# Patient Record
Sex: FEMALE | Race: BLACK OR AFRICAN AMERICAN | Employment: FULL TIME | URBAN - METROPOLITAN AREA
[De-identification: names, ages, dates, MRNs, and addresses within clinical notes are randomized per-mention and may not be internally consistent; named-entity substitution may affect disease eponyms.]

---

## 2017-07-11 ENCOUNTER — HOSPITAL ENCOUNTER (OUTPATIENT)
Dept: PHYSICAL THERAPY | Age: 47
Discharge: HOME | End: 2017-07-11
Payer: COMMERCIAL

## 2017-07-11 PROCEDURE — 97110 THERAPEUTIC EXERCISES: CPT

## 2017-07-11 PROCEDURE — 97530 THERAPEUTIC ACTIVITIES: CPT

## 2017-07-11 PROCEDURE — 97162 PT EVAL MOD COMPLEX 30 MIN: CPT

## 2017-07-11 NOTE — PROGRESS NOTES
PT DAILY TREATMENT NOTE     Patient Name: Nila Li  Date:2017  : 1970  [x]  Patient  Verified  Payor: BLUE [employer] / Plan: Tayo Meek [id number] PPO / Product Type: PPO /    In time:1:50 pm   Out time:2:45 pm   Total Treatment Time (min): 55  Visit #: 1 of 6    Treatment Area: Contusion of left hip [S70.02XA]    SUBJECTIVE  Pain Level (0-10 scale): 1-2  Any medication changes, allergies to medications, adverse drug reactions, diagnosis change, or new procedure performed?: [x] No    [] Yes (see summary sheet for update)  Subjective functional status/changes:   [] No changes reported    Hx Present Illness: Pt reported that while on vacation in [place] slipped on stairs (on boat) and hip right hip   DOI 17   X-rays immediately following - per pt no fracture  No outward bruising but increased pain   Increase in pain with sitting, lying on the right side, walking, driving  Pain immediately with sitting   Reports that feels like something is weighting on it  Tender to touch  PLOF - pain free and unrestricted with daily, recreational and work related activities      Pain:  _5__/10 max       __0-1_/10 min     _1-2___/10 now    Location: indicates right iliac crest to ischial tuberosity     [] Sharp    [] Dull      [x] Burning     []  Aching     [] Throbbing      [] Tingling     [] Other:       [x]  Constant                   [] Intermittent        Previous treatment:   Ice, lidocaine patch - reports some relief, Tylenol extra strength - per pt 2 x daily   Flexeril - currently not taking  Prednisone - 4 days - unsure if Flexeril or prednisone making difference in managing sx      PMHX: Significant for: unremarkable as per pt     Social/Recreation/Work: Administrative - sitting, desk work,   Per pt interfering with sitting and walking, reports increase in pain with reach  Work-out/exercise at [place], bicycle, weights - currently not exercising  Walking for exercise- 2 days per week     Patient Goal(s): \"To be out of pain. \"      OBJECTIVE    Modality rationale:    Min Type Additional Details    [] Estim:  []Unatt       []IFC  []Premod                        []Other:  []w/ice   []w/heat  Position:  Location:    [] Estim: []Att    []TENS instruct  []NMES                    []Other:  []w/US   []w/ice   []w/heat  Position:  Location:    []  Traction: [] Cervical       []Lumbar                       [] Prone          []Supine                       []Intermittent   []Continuous Lbs:  [] before manual  [] after manual    []  Ultrasound: []Continuous   [] Pulsed                           []1MHz   []3MHz W/cm2:  Location:    []  Iontophoresis with dexamethasone         Location: [] Take home patch   [] In clinic    []  Ice     []  heat  []  Ice massage  []  Laser   []  Anodyne Position:  Location:    []  Laser with stim  []  Other:  Position:  Location:    []  Vasopneumatic Device Pressure:       [] lo [] med [] hi   Temperature: [] lo [] med [] hi   [] Skin assessment post-treatment:  []intact []redness- no adverse reaction    []redness  adverse reaction:     35 min [x]Eval                  []Re-Eval       10 min Therapeutic Exercise:  [] See flow sheet : reviewed HEP   Rationale: increase ROM and increase strength to improve the patients ability to perform daily activities with decreased pain and symptom levels            With   [] TE   [x] TA - 10 min   [] neuro   [] other: Patient Education: [x] Review HEP    [] Progressed/Changed HEP based on:   [x] positioning - sleeping   [] body mechanics   [] transfers   [x] heat/ice application     [x] other: reviewed exam findings,anatomy involved, POC     Other Objective/Functional Measures:    Movement/gait:  Right shoulder lower, right lateralization, decreased right arm swing     Visual Inspection: Thoracic: WFL  Lumbar: increased  Shoulder/Scapula: right shoulder and scap lower, inferior angle tipped bilaterally   No visible swelling or brising    Palpation: TTP along right iliac crest, greater trochanter, glute med and min                            AROM/PROM Right Left   Hip Flex     Hip IR 25 28   Hip ER 20 24                    Strength Right Left   Hip Flex 4+ 4+   Knee Ext 5 5   Hamstrings 4+ 4+   Hip Abd 4- 4-   Hip Ext 4 4   Hip IR     Hip ER          Glut inhibition bilaterally with MMT of Hip Ext    Special Tests Right Left   Trunk Rot (hooklying) 18 in 17 in    SUYAPA - -   Hip Scour - -   Gaenslen's  + +, right side p! Passive SLR 90 80          Other Right Left                                       Other Comments: Standing Forward Flexion finger tips to floor, decreased reversal of lordosis and decreased use of gluts to stand  Gillet: hypomobility bilaterally Right >left, increased lateralization difficulty with stance on Right LE         Pain Level (0-10 scale) post treatment: 1-2    ASSESSMENT/Changes in Function:   Pt is a 51 y. o.female with C/C of right lateral hip pain. Onset occurred after pt slipped and fell, hitting right hip on steps on 6/26/17 while pt was on vacation. Signs/Symptoms consistent with right hip contusion and SIJ dysfunction. Objective findings include positive SIJ provocation tests, decreased hip IR and ER ROM, decreased trunk rotation, decreased glut strength and TTP along right iliac crest, greater trochanter, glute med and min. Functional limitations include pain with sitting, driving, right sidelying and walking. Patient will continue to benefit from skilled PT services to modify and progress therapeutic interventions, address functional mobility deficits, address ROM deficits, address strength deficits, analyze and address soft tissue restrictions, analyze and cue movement patterns, analyze and modify body mechanics/ergonomics, assess and modify postural abnormalities and instruct in home and community integration to attain remaining goals.      [x]  See Plan of Care  []  See progress note/recertification  []  See Discharge Summary         Progress towards goals / Updated goals:  Short Term Goals: STG- To be accomplished in 2 visit(s):  1. Pt will be independent with HEP to encourage prophylaxis. Eval:dispensed  Current: NA    Long Term Goals: LTG- To be accomplished in 4 week(s):  1. Pt will demonstrate ability to sit >45 min without pain to allow pt to perform work related activities. Eval:pain immediately with sitting. Current: NA    2. Pt will increase hip IR and ER AROM to [facility] to increase tolerance to ADLs. Eval:  AROM/PROM Right Left   Hip IR 25 28   Hip ER 20 24     Current: NA    3. Pt will increase trunk rotation to 16 in or less bilaterally to indicate mobility needed for daily activities. Eval:Right 18 in Left 17 in   Current: NA    4. Pt FOTO score will increase by >15 points to show improvement in function. Eval:66  Current: will address at visit 5      PLAN  [x]  Upgrade activities as tolerated     []  Continue plan of care  []  Update interventions per flow sheet       []  Discharge due to:_  []  Other:_      Maryanne Farris, PT, DPT 7/11/2017  1:54 PM    No future appointments.

## 2017-07-13 NOTE — PROGRESS NOTES
[facility] at the [address], [place], [address] [facility]  Phone: [phone]      Fax:  [phone]       Plan of Care/ Statement of Necessity for Physical Therapy Services      Patient name: Enrique Winslow Start of Care: 2017   Referral source: John Cherry MD : 1970    Medical Diagnosis: Contusion of left hip [S70.02XA]   Onset Date:17    Treatment Diagnosis: Left hip pain and Lumbopelvic Dysfunction    Prior Hospitalization: see medical history Provider#: [id number]   Medications: Verified on Patient summary List    Comorbidities: BMI >30, Hx of back pain, HTN    Prior Level of Function: pain free and unrestricted with daily, recreational and work related activities      The Plan of Care and following information is based on the information from the initial evaluation. Assessment/ key information:   Pt is a 51 y. o.female with C/C of right lateral hip pain. Onset occurred after pt slipped and fell, hitting right hip on steps on 17 while pt was on vacation. Signs/Symptoms consistent with right hip contusion and SIJ dysfunction. Objective findings include positive SIJ provocation tests, decreased hip IR and ER ROM, decreased trunk rotation, decreased glut strength and TTP along right iliac crest, greater trochanter, glute med and min. Functional limitations include pain with sitting, driving, right sidelying and walking. Evaluation Complexity History MEDIUM  Complexity : 1-2 comorbidities / personal factors will impact the outcome/ POC ; Examination HIGH Complexity : 4+ Standardized tests and measures addressing body structure, function, activity limitation and / or participation in recreation  ;Presentation MEDIUM Complexity : Evolving with changing characteristics  ; Clinical Decision Making MEDIUM Complexity : FOTO score of 26-74  Overall Complexity Rating: MEDIUM  Problem List: pain affecting function, decrease ROM, decrease strength, impaired gait/ balance, decrease ADL/ functional abilitiies, decrease activity tolerance and decrease flexibility/ joint mobility   Treatment Plan may include any combination of the following: Therapeutic exercise, Therapeutic activities, Neuromuscular re-education, Physical agent/modality, Gait/balance training, Manual therapy and Patient education  Patient / Family readiness to learn indicated by: asking questions, trying to perform skills and interest  Persons(s) to be included in education: patient (P)  Barriers to Learning/Limitations: None  Patient Goal (s): To be out of pain  Patient Self Reported Health Status: good  Rehabilitation Potential: good    Short Term Goals: STG- To be accomplished in 2 visit(s):  1. Pt will be independent with HEP to encourage prophylaxis. Eval:dispensed  Current: NA     Long Term Goals: LTG- To be accomplished in 4 week(s):  1. Pt will demonstrate ability to sit >45 min without pain to allow pt to perform work related activities. Eval:pain immediately with sitting. Current: NA     2.  Pt will increase hip IR and ER AROM to [facility] to increase tolerance to ADLs. Eval:  AROM/PROM Right Left   Hip IR 25 28   Hip ER 20 24      Current: NA     3.  Pt will increase trunk rotation to 16 in or less bilaterally to indicate mobility needed for daily activities. Eval:Right 18 in Left 17 in   Current: NA     4. Pt FOTO score will increase by >15 points to show improvement in function. Eval:66  Current: will address at visit 5    Frequency / Duration: Patient to be seen 1-2 times per week for 2-4 weeks. Pt may only be able to come 1x per week due to co-pay.     Patient/ Caregiver education and instruction: Diagnosis, prognosis, self care, activity modification and exercises   [x]  Plan of care has been reviewed with PTA Saintclair Butte, PT, DPT 7/13/2017 9:55 PM  _____________________________________________________________________  I certify that the above Therapy Services are being furnished while the patient is under my care. I agree with the treatment plan and certify that this therapy is necessary.     Physician's Signature:____________________  Date:__________Time:______    Please sign and return to [facility] at the [address], [address] [facility]       Phone: [phone]      Fax:  [phone]

## 2017-07-18 ENCOUNTER — HOSPITAL ENCOUNTER (OUTPATIENT)
Dept: PHYSICAL THERAPY | Age: 47
Discharge: HOME | End: 2017-07-18
Payer: COMMERCIAL

## 2017-07-18 PROCEDURE — 97110 THERAPEUTIC EXERCISES: CPT

## 2017-07-18 PROCEDURE — 97530 THERAPEUTIC ACTIVITIES: CPT

## 2017-07-18 NOTE — PROGRESS NOTES
PT DAILY TREATMENT NOTE     Patient Name: Margi Postal  Date:2017  : 1970  [x]  Patient  Verified  Payor: BLUE [employer]OSS / Plan: [facility] PPO / Product Type: PPO /    In time:2:06  Out time:2:58  Total Treatment Time (min): 52  Visit #: 2 of 6    Treatment Area: Contusion of left hip [S70.02XA]    SUBJECTIVE  Pain Level (0-10 scale): 0/10  Any medication changes, allergies to medications, adverse drug reactions, diagnosis change, or new procedure performed?: [x] No    [] Yes (see summary sheet for update)  Subjective functional status/changes:   [] No changes reported  \"I don't have any pain. I only have pain after I do my exercises. \"    OBJECTIVE    30 min Therapeutic Exercise:  [x] See flow sheet :   Rationale: increase ROM, increase strength and improve coordination to improve the patients ability to perform ADLs with less pain. 22 min Therapeutic Activity:  [x]  See flow sheet :   Rationale: increase ROM, increase strength and improve coordination  to improve the patients ability to perform ADLs with less pain. With   [] TE   [] TA   [] neuro   [] other: Patient Education: [x] Review HEP    [] Progressed/Changed HEP based on:   [] positioning   [] body mechanics   [] transfers   [] heat/ice application    [] other:      Other Objective/Functional Measures:      Pain Level (0-10 scale) post treatment: 0/10    ASSESSMENT/Changes in Function: Pt able to demonstrate HEP and show understanding to ensure maximum benefit. Pt had no increased pain with ex. Pt denied modalities post tx. Patient will continue to benefit from skilled PT services to modify and progress therapeutic interventions, address functional mobility deficits, address ROM deficits, analyze and address soft tissue restrictions, analyze and modify body mechanics/ergonomics and assess and modify postural abnormalities to attain remaining goals.      []  See Plan of Care  []  See progress note/recertification  []  See Discharge Summary         Progress towards goals / Updated goals:  Short Term Goals: STG- To be accomplished in 2 visit(s):  1.  Pt will be independent with HEP to encourage prophylaxis. Eval:dispensed  Current: pt reports compliance      Long Term Goals: LTG- To be accomplished in 4 week(s):  1.  Pt will demonstrate ability to sit >45 min without pain to allow pt to perform work related activities. Eval:pain immediately with sitting. Current: NA      2.  Pt will increase hip IR and ER AROM to [facility] to increase tolerance to ADLs. Eval:  AROM/PROM Right Left   Hip IR 25 28   Hip ER 20 24       Current: NA      3.  Pt will increase trunk rotation to 16 in or less bilaterally to indicate mobility needed for daily activities. Eval:Right 18 in Left 17 in   Current: NA      4.  Pt FOTO score will increase by >15 points to show improvement in function.   Eval:66  Current: will address at visit 5      PLAN  []  Upgrade activities as tolerated     [x]  Continue plan of care  []  Update interventions per flow sheet       []  Discharge due to:_  []  Other:_      Tony Echevarria 7/18/2017  3:50 PM    Future Appointments  Date Time Provider [place]   7/25/2017 1:30 PM Tony Echevarria [facility]

## 2017-07-25 ENCOUNTER — HOSPITAL ENCOUNTER (OUTPATIENT)
Dept: PHYSICAL THERAPY | Age: 47
Discharge: HOME | End: 2017-07-25
Payer: COMMERCIAL

## 2017-07-25 PROCEDURE — 97530 THERAPEUTIC ACTIVITIES: CPT

## 2017-07-25 PROCEDURE — 97110 THERAPEUTIC EXERCISES: CPT

## 2017-07-25 NOTE — PROGRESS NOTES
PT DAILY TREATMENT NOTE     Patient Name: Farrah York  Date:2017  : 1970  [x]  Patient  Verified  Payor: BLUE [employer]OSS / Plan: [facility] PPO / Product Type: PPO /    In time:1:30  Out time:2:26  Total Treatment Time (min): 56  Visit #: 3 of 6    Treatment Area: Contusion of left hip [S70.02XA]    SUBJECTIVE  Pain Level (0-10 scale): 0/10  Any medication changes, allergies to medications, adverse drug reactions, diagnosis change, or new procedure performed?: [x] No    [] Yes (see summary sheet for update)  Subjective functional status/changes:   [] No changes reported  \"I don't have any pain right now. I only get pain when I do exercises. \"    OBJECTIVE    Modality rationale: decrease edema, decrease inflammation and decrease pain to improve the patients ability to perform ADLs with less pain.     Min Type Additional Details    [] Estim:  []Unatt       []IFC  []Premod                        []Other:  []w/ice   []w/heat  Position:  Location:    [] Estim: []Att    []TENS instruct  []NMES                    []Other:  []w/US   []w/ice   []w/heat  Position:  Location:    []  Traction: [] Cervical       []Lumbar                       [] Prone          []Supine                       []Intermittent   []Continuous Lbs:  [] before manual  [] after manual    []  Ultrasound: []Continuous   [] Pulsed                           []1MHz   []3MHz W/cm2:  Location:    []  Iontophoresis with dexamethasone         Location: [] Take home patch   [] In clinic   10 [x]  Ice     []  heat  []  Ice massage  []  Laser   []  Anodyne Position: supine  Location: low back    []  Laser with stim  []  Other:  Position:  Location:    []  Vasopneumatic Device Pressure:       [] lo [] med [] hi   Temperature: [] lo [] med [] hi   [x] Skin assessment post-treatment:  [x]intact []redness- no adverse reaction    []redness  adverse reaction:     30 min Therapeutic Exercise:  [x] See flow sheet :   Rationale: increase ROM, increase strength and improve coordination to improve the patients ability to perform ADLs with less pain. 16 min Therapeutic Activity:  [x]  See flow sheet :   Rationale: increase ROM, increase strength and improve coordination  to improve the patients ability to perform ADLs with less pain. With   [] TE   [] TA   [] neuro   [] other: Patient Education: [x] Review HEP    [] Progressed/Changed HEP based on:   [] positioning   [] body mechanics   [] transfers   [] heat/ice application    [] other:      Other Objective/Functional Measures: updated measures     Pain Level (0-10 scale) post treatment: 0/10    ASSESSMENT/Changes in Function: Pt reports increased pain after ex at home but no pain post tx. Pt received CP post tx. Pt shows progression in objective measurements. Patient will continue to benefit from skilled PT services to modify and progress therapeutic interventions, address functional mobility deficits, address ROM deficits, address strength deficits, analyze and address soft tissue restrictions and analyze and modify body mechanics/ergonomics to attain remaining goals. []  See Plan of Care  []  See progress note/recertification  []  See Discharge Summary         Progress towards goals / Updated goals:  Short Term Goals: STG- To be accomplished in 2 visit(s):  1.  Pt will be independent with HEP to encourage prophylaxis. Eval:dispensed  Current: pt reports compliance      Long Term Goals: LTG- To be accomplished in 4 week(s):  1.  Pt will demonstrate ability to sit >45 min without pain to allow pt to perform work related activities. Eval:pain immediately with sitting. Current: 20 minutes without pain. Pain increases after 20      2.  Pt will increase hip IR and ER AROM to [facility] to increase tolerance to ADLs.   Eval:  AROM/PROM Right Left   Hip IR 25 28   Hip ER 20 24       Current: Hip: IR: Right: 27, Left: 32 ER: Right: 22, Left: 25      3.  Pt will increase trunk rotation to 16 in or less bilaterally to indicate mobility needed for daily activities. Eval:Right 18 in Left 17 in   Current: Right: 16, Left: 18.5      4.  Pt FOTO score will increase by >15 points to show improvement in function.   Eval:66  Current: will address at visit 5         PLAN  []  Upgrade activities as tolerated     [x]  Continue plan of care  []  Update interventions per flow sheet       []  Discharge due to:_  []  Other:_      Tony Echevarria 7/25/2017  2:34 PM    Future Appointments  Date Time Provider [place]   8/1/2017 9:30 AM Nicolette Bedolla, PT, DPT [facility]

## 2017-08-01 ENCOUNTER — APPOINTMENT (OUTPATIENT)
Dept: PHYSICAL THERAPY | Age: 47
End: 2017-08-01
Payer: COMMERCIAL

## 2017-08-08 ENCOUNTER — HOSPITAL ENCOUNTER (OUTPATIENT)
Dept: PHYSICAL THERAPY | Age: 47
Discharge: HOME | End: 2017-08-08
Payer: COMMERCIAL

## 2017-08-08 PROCEDURE — 97530 THERAPEUTIC ACTIVITIES: CPT

## 2017-08-08 PROCEDURE — 97112 NEUROMUSCULAR REEDUCATION: CPT

## 2017-08-08 PROCEDURE — 97110 THERAPEUTIC EXERCISES: CPT

## 2017-08-08 NOTE — PROGRESS NOTES
PT DAILY TREATMENT NOTE     Patient Name: Rocky Osuna  Date:2017  : 1970  [x]  Patient  Verified  Payor: BLUE CROSS / Plan: [facility] PPO / Product Type: PPO /    In time:12:00 pm  Out time:1:04 pm   Total Treatment Time (min): 64  Visit #: 4 of 6    Treatment Area: Contusion of left hip [S70.02XA]    SUBJECTIVE  Pain Level (0-10 scale): 1  Any medication changes, allergies to medications, adverse drug reactions, diagnosis change, or new procedure performed?: [x] No    [] Yes (see summary sheet for update)  Subjective functional status/changes:   [] No changes reported  \"I am coming along I guess. I always have pain, I know it is there. I know it wasn't like that before. \"    OBJECTIVE    Modality rationale: decrease pain and increase tissue extensibility to improve the patients ability to tolerate daily activities    Min Type Additional Details    [] Estim:  []Unatt       []IFC  []Premod                        []Other:  []w/ice   []w/heat  Position:  Location:    [] Estim: []Att    []TENS instruct  []NMES                    []Other:  []w/US   []w/ice   []w/heat  Position:  Location:    []  Traction: [] Cervical       []Lumbar                       [] Prone          []Supine                       []Intermittent   []Continuous Lbs:  [] before manual  [] after manual    []  Ultrasound: []Continuous   [] Pulsed                           []1MHz   []3MHz W/cm2:  Location:    []  Iontophoresis with dexamethasone         Location: [] Take home patch   [] In clinic   10 []  Ice     [x]  heat  []  Ice massage  []  Laser   []  Anodyne Position: supine with LE elevated  Location: L-spine    []  Laser with stim  []  Other:  Position:  Location:    []  Vasopneumatic Device Pressure:       [] lo [] med [] hi   Temperature: [] lo [] med [] hi   [x] Skin assessment post-treatment:  [x]intact []redness- no adverse reaction    []redness  adverse reaction:       29 min Therapeutic Exercise: [x] See flow sheet :   Rationale: increase ROM, increase strength, improve coordination and increase proprioception to improve the patients ability to perform daily activities with decreased pain and symptom levels    10 min Therapeutic Activity:  [x]  See flow sheet :   Rationale: increase ROM, increase strength, improve coordination and increase proprioception  to improve the patients ability to perform daily activities with decreased pain and symptom levels     15 min Neuromuscular Re-education:  [x]  See flow sheet : tactile cues for 90-90 and scissor slides   Rationale: increase ROM, increase strength, improve coordination and increase proprioception  to improve the patients ability to perform daily activities with decreased pain and symptom levels          With   [] TE   [x] TA   [] neuro   [] other: Patient Education: [x] Review HEP    [] Progressed/Changed HEP based on:   [] positioning   [] body mechanics   [] transfers   [] heat/ice application    [] other:      Other Objective/Functional Measures: Forward flexion: fingers to floor, decreased use of gluts to stand    Hip Add Drop (pre/post): Right: +/- Left: +/+ with 1 inch from table    Trunk Rot Right: 15.5 in Left: 16 in     Pain Level (0-10 scale) post treatment: 0    ASSESSMENT/Changes in Function:   Pt reports continued low level pain, increases with sitting. Very TTP over posterior hip musculature and right greater trochanter. Discussed continuing PT x1-2 weeks. Made recommendations for sitting modifications. Patient will continue to benefit from skilled PT services to modify and progress therapeutic interventions, address functional mobility deficits, address ROM deficits, address strength deficits, analyze and address soft tissue restrictions, analyze and cue movement patterns, analyze and modify body mechanics/ergonomics, assess and modify postural abnormalities and instruct in home and community integration to attain remaining goals. []  See Plan of Care  []  See progress note/recertification  []  See Discharge Summary         Progress towards goals / Updated goals:  Short Term Goals: STG- To be accomplished in 2 visit(s):  1.  Pt will be independent with HEP to encourage prophylaxis. Eval:dispensed  Current: pt reports compliance      Long Term Goals: LTG- To be accomplished in 4 week(s):  1.  Pt will demonstrate ability to sit >45 min without pain to allow pt to perform work related activities. Eval:pain immediately with sitting. Current: 20 minutes without pain. Pain increases after 20      2.  Pt will increase hip IR and ER AROM to [facility] to increase tolerance to ADLs. Eval:  AROM/PROM Right Left   Hip IR 25 28   Hip ER 20 24       Current: Hip: IR: Right: 27, Left: 32 ER: Right: 22, Left: 25      3.  Pt will increase trunk rotation to 16 in or less bilaterally to indicate mobility needed for daily activities. Eval:Right 18 in Left 17 in   Current: Right: 15.5 in, Left: 16in - Partially Met      4.  Pt FOTO score will increase by >15 points to show improvement in function. Eval:66  Current: will address at visit 5      PLAN  [x]  Upgrade activities as tolerated     []  Continue plan of care  []  Update interventions per flow sheet       []  Discharge due to:_  []  Other:_      Debby Carcamo, PT, DPT 8/8/2017  12:07 PM    No future appointments.

## 2017-08-29 ENCOUNTER — HOSPITAL ENCOUNTER (OUTPATIENT)
Dept: PREADMISSION TESTING | Age: 47
Discharge: HOME | End: 2017-08-29
Payer: COMMERCIAL

## 2017-08-29 VITALS — HEIGHT: 65 IN | WEIGHT: 211 LBS | BODY MASS INDEX: 35.16 KG/M2

## 2017-08-29 LAB
ANION GAP SERPL CALC-SCNC: 8 MMOL/L (ref 3–18)
BUN SERPL-MCNC: 12 MG/DL (ref 7–18)
BUN/CREAT SERPL: 15 (ref 12–20)
CALCIUM SERPL-MCNC: 9.2 MG/DL (ref 8.5–10.1)
CHLORIDE SERPL-SCNC: 103 MMOL/L (ref 100–108)
CO2 SERPL-SCNC: 26 MMOL/L (ref 21–32)
CREAT SERPL-MCNC: 0.78 MG/DL (ref 0.6–1.3)
GLUCOSE SERPL-MCNC: 132 MG/DL (ref 74–99)
HCT VFR BLD AUTO: 39.9 % (ref 35–45)
HGB BLD-MCNC: 12.6 G/DL (ref 12–16)
POTASSIUM SERPL-SCNC: 4.1 MMOL/L (ref 3.5–5.5)
SODIUM SERPL-SCNC: 137 MMOL/L (ref 136–145)

## 2017-08-29 PROCEDURE — 93005 ELECTROCARDIOGRAM TRACING: CPT

## 2017-08-29 PROCEDURE — 80048 BASIC METABOLIC PNL TOTAL CA: CPT | Performed by: ANESTHESIOLOGY

## 2017-08-29 PROCEDURE — 85018 HEMOGLOBIN: CPT | Performed by: ANESTHESIOLOGY

## 2017-08-29 RX ORDER — ATENOLOL 25 MG/1
25 TABLET ORAL DAILY
COMMUNITY

## 2017-08-29 RX ORDER — CEFAZOLIN SODIUM 2 G/50ML
2 SOLUTION INTRAVENOUS ONCE
Status: CANCELLED | OUTPATIENT
Start: 2017-08-29 | End: 2017-08-29

## 2017-08-29 RX ORDER — SODIUM CHLORIDE, SODIUM LACTATE, POTASSIUM CHLORIDE, CALCIUM CHLORIDE 600; 310; 30; 20 MG/100ML; MG/100ML; MG/100ML; MG/100ML
125 INJECTION, SOLUTION INTRAVENOUS CONTINUOUS
Status: CANCELLED | OUTPATIENT
Start: 2017-08-29

## 2017-08-29 NOTE — PERIOP NOTES
No sleep apnea , had previous negative sleep study. No history of MH. PCP is aware of surgery. Care fusion instructions reviewed and kit given. Does not meet criteria for special population at this time. Not participating in clinical trials or research study.

## 2017-08-30 LAB
ATRIAL RATE: 81 BPM
CALCULATED P AXIS, ECG09: 56 DEGREES
CALCULATED R AXIS, ECG10: 27 DEGREES
CALCULATED T AXIS, ECG11: 7 DEGREES
DIAGNOSIS, 93000: NORMAL
P-R INTERVAL, ECG05: 172 MS
Q-T INTERVAL, ECG07: 374 MS
QRS DURATION, ECG06: 100 MS
QTC CALCULATION (BEZET), ECG08: 434 MS
VENTRICULAR RATE, ECG03: 81 BPM

## 2017-09-06 NOTE — H&P
A    Urology [address]  [address] [place] [address], [address] [place] [address]  phone: [phone]  fax: [phone]          Patient: Mark Whitman  YOB: 1970  Date: 09/05/2017 6:33 AM   Visit Type: Chart Update      Assessment/Plan  # Detail Type Description    1. Assessment Frequency of micturition (R35.0). Patient Plan Pt underwent PNE for her urinary difficulties. She had very good results and wants to proceed with placement of Interstim device. All risks including pain infection bleeding reviewed. She understands and wishes to proceed         2. Assessment Urgency of urination (R39.15). This 49year old female presents for Overactive Bladder and Hematuria. History of Present Illness:  1. Overactive Bladder      Onset was gradual. It occurs daily. The problem is worse. Associated symptoms include hematuria, nocturia (4 times per night), urgency, urinary incontinence (rare stress) and urinary frequency. Pertinent negatives include pelvic pressure and pressure. Additional information: Pt with alot of frequency urgency and nocturia up to 4 times. She cuts down her evening fluids after dinner. She does not drink caffiene. Patient has tried multiple treatments with no improvement. She is here today to undergo peripheral nerve evaluation (PNE). 2.  Hematuria      The patient presents with hematuria (microscopic). The problem is unchanged. She denies pain. Pertinent history includes being at least 35years of age but not history of UTIs or prior prostate surgeries. Denies aggravating factors. Denies relieving factors. She also complains of nocturia, urinary frequency, urinary incontinence and urgency. She denies nausea, pressure and vomiting. Additional information: Patient with history of microscopic hematuria. She completed her evaluation November 2016. No significant findings were noted.                   PAST MEDICAL/SURGICAL HISTORY   (Reviewed, updated)    Disease/disorder Onset Date Management Date Comments   Foot surgery       Uterine Ablation       foot surgery 1998      tib fib frac         DIAGNOSTICS HISTORY:  Test Ordered Interpretation Result completed   CT Abdomen/Pelvis   renal tubular ectasia, fibroids 10/14/2016     Test Ordered Ordering Comments Modifier   CT Abdomen/Pelvis          PROBLEM LIST:  Problem Description Onset Date   Mixed hyperlipidemia 2014   Uterine leiomyoma 2014     Allergies  Ingredient Reaction Medication Name Comment   ERYTHROMYCIN BASE        Family History:  (Reviewed, updated)  Relationship Family Member Name  Age at Death Condition Onset Age Cause of Death   Brother    Killed 39 N   Father    Cancer, throat 37 N   Mother    Leukemia 58 N         Social History:  (Reviewed, updated)  Preferred language is [place]. MARITAL STATUS/FAMILY/SOCIAL SUPPORT  Currently . Tobacco use status: Never smoked tobacco.  Smoking status: Never smoker. No passive smoke exposure. ALCOHOL  There is a history of alcohol use. consumed moderately. LIFESTYLE  Moderate activity level. Exercises 3-4 times a week. Review of Systems  System Neg/Pos Details   Constitutional Negative Chills, fever, pain and weight loss. Eyes Negative Blurred vision and double vision. Respiratory Negative Asthma and chronic cough. Cardio Negative Chest pain, heart murmur and irregular heartbeat/palpitations. GI Negative Abdominal pain, constipation, heartburn, nausea and vomiting.  Positive Hematuria, Nocturia, Urgency, Urinary frequency, Urinary incontinence.  Negative Pelvic pressure and pressure. Neuro Negative Headache, poor or worsening memory and seizures. MS Negative Back pain, difficulty walking and joint pain. Reproductive Negative Breast lump(s), breast pain and vaginal discharge. Physical Exam  Exam Findings Details   Constitutional Normal Well developed.    Neck Exam Normal Inspection - Normal.   Respiratory Normal Inspection - Normal.   Abdomen Normal No abdominal tenderness. Genitourinary Normal No Suprapubic tenderness. No CVA tenderness. No Flank mass   Extremity Normal No Edema. Psychiatric Normal Oriented to time, place, person and situation. Appropriate mood and affect. Medications (added, continued, or stopped today)  Started Medication Directions Instruction Stopped   07/26/2017 ATENOLOL 25 MG TABLET TAKE 1 TABLET BY ORAL ROUTE EVERY DAY     06/29/2017 cyclobenzaprine 10 mg tablet take 1 tablet by oral route 3 times every day     06/01/2017 Ortho-Novum 7/7/7 (28) 0.5 mg/0.75 mg/1 mg-35 mcg tablet 1 po q day as directed     06/29/2017 prednisone 10 mg tablet Taper 40 mg to 10 mg, 2 days each with food     Completed by:        Radha Gomez  09/05/2017 6:37 AM   Document generated by:  Maya Garcaí 09/05/2017 06:37 AM      -----------------------------------------------------------------------------------------------------------

## 2017-09-07 ENCOUNTER — ANESTHESIA (OUTPATIENT)
Dept: SURGERY | Age: 47
End: 2017-09-07
Payer: COMMERCIAL

## 2017-09-07 ENCOUNTER — ANESTHESIA EVENT (OUTPATIENT)
Dept: SURGERY | Age: 47
End: 2017-09-07
Payer: COMMERCIAL

## 2017-09-07 ENCOUNTER — HOSPITAL ENCOUNTER (OUTPATIENT)
Age: 47
Setting detail: OUTPATIENT SURGERY
Discharge: HOME | End: 2017-09-07
Attending: UROLOGY | Admitting: UROLOGY
Payer: COMMERCIAL

## 2017-09-07 ENCOUNTER — APPOINTMENT (OUTPATIENT)
Dept: GENERAL RADIOLOGY | Age: 47
End: 2017-09-07
Attending: UROLOGY
Payer: COMMERCIAL

## 2017-09-07 VITALS
OXYGEN SATURATION: 100 % | DIASTOLIC BLOOD PRESSURE: 88 MMHG | SYSTOLIC BLOOD PRESSURE: 131 MMHG | WEIGHT: 208.19 LBS | TEMPERATURE: 98.9 F | RESPIRATION RATE: 15 BRPM | HEIGHT: 66 IN | BODY MASS INDEX: 33.46 KG/M2 | HEART RATE: 63 BPM

## 2017-09-07 LAB — HCG SERPL QL: NEGATIVE

## 2017-09-07 PROCEDURE — C1894 INTRO/SHEATH, NON-LASER: HCPCS | Performed by: UROLOGY

## 2017-09-07 PROCEDURE — 77030018842 HC SOL IRR SOD CL 9% BAXT -A: Performed by: UROLOGY

## 2017-09-07 PROCEDURE — 76010000153 HC OR TIME 1.5 TO 2 HR: Performed by: UROLOGY

## 2017-09-07 PROCEDURE — C1778 LEAD, NEUROSTIMULATOR: HCPCS | Performed by: UROLOGY

## 2017-09-07 PROCEDURE — 74011250636 HC RX REV CODE- 250/636: Performed by: UROLOGY

## 2017-09-07 PROCEDURE — 74011000250 HC RX REV CODE- 250: Performed by: UROLOGY

## 2017-09-07 PROCEDURE — 74011000250 HC RX REV CODE- 250

## 2017-09-07 PROCEDURE — C1767 GENERATOR, NEURO NON-RECHARG: HCPCS | Performed by: UROLOGY

## 2017-09-07 PROCEDURE — 77030010507 HC ADH SKN DERMBND J&J -B: Performed by: UROLOGY

## 2017-09-07 PROCEDURE — 77030011640 HC PAD GRND REM COVD -A: Performed by: UROLOGY

## 2017-09-07 PROCEDURE — 36415 COLL VENOUS BLD VENIPUNCTURE: CPT | Performed by: UROLOGY

## 2017-09-07 PROCEDURE — C1787 PATIENT PROGR, NEUROSTIM: HCPCS | Performed by: UROLOGY

## 2017-09-07 PROCEDURE — 76210000021 HC REC RM PH II 0.5 TO 1 HR: Performed by: UROLOGY

## 2017-09-07 PROCEDURE — 77030013079 HC BLNKT BAIR HGGR 3M -A: Performed by: ANESTHESIOLOGY

## 2017-09-07 PROCEDURE — 74011000272 HC RX REV CODE- 272: Performed by: UROLOGY

## 2017-09-07 PROCEDURE — 74011250636 HC RX REV CODE- 250/636

## 2017-09-07 PROCEDURE — 77030020782 HC GWN BAIR PAWS FLX 3M -B: Performed by: UROLOGY

## 2017-09-07 PROCEDURE — 77030012020 HC ANTENA NEURSTM MEDT -B: Performed by: UROLOGY

## 2017-09-07 PROCEDURE — 76210000006 HC OR PH I REC 0.5 TO 1 HR: Performed by: UROLOGY

## 2017-09-07 PROCEDURE — 77030031139 HC SUT VCRL2 J&J -A: Performed by: UROLOGY

## 2017-09-07 PROCEDURE — 84703 CHORIONIC GONADOTROPIN ASSAY: CPT | Performed by: UROLOGY

## 2017-09-07 PROCEDURE — 76060000034 HC ANESTHESIA 1.5 TO 2 HR: Performed by: UROLOGY

## 2017-09-07 DEVICE — GENERATOR INTERSTIM X --: Type: IMPLANTABLE DEVICE | Site: BUTTOCKS | Status: FUNCTIONAL

## 2017-09-07 DEVICE — LEAD KT STIM INTERSTIM 28CM --: Type: IMPLANTABLE DEVICE | Site: BUTTOCKS | Status: FUNCTIONAL

## 2017-09-07 RX ORDER — MAGNESIUM SULFATE 100 %
4 CRYSTALS MISCELLANEOUS AS NEEDED
Status: DISCONTINUED | OUTPATIENT
Start: 2017-09-07 | End: 2017-09-07 | Stop reason: HOSPADM

## 2017-09-07 RX ORDER — ONDANSETRON 2 MG/ML
INJECTION INTRAMUSCULAR; INTRAVENOUS AS NEEDED
Status: DISCONTINUED | OUTPATIENT
Start: 2017-09-07 | End: 2017-09-07 | Stop reason: HOSPADM

## 2017-09-07 RX ORDER — PROPOFOL 10 MG/ML
INJECTION, EMULSION INTRAVENOUS AS NEEDED
Status: DISCONTINUED | OUTPATIENT
Start: 2017-09-07 | End: 2017-09-07 | Stop reason: HOSPADM

## 2017-09-07 RX ORDER — LIDOCAINE HYDROCHLORIDE 20 MG/ML
INJECTION, SOLUTION EPIDURAL; INFILTRATION; INTRACAUDAL; PERINEURAL AS NEEDED
Status: DISCONTINUED | OUTPATIENT
Start: 2017-09-07 | End: 2017-09-07 | Stop reason: HOSPADM

## 2017-09-07 RX ORDER — CEFAZOLIN SODIUM 2 G/50ML
2 SOLUTION INTRAVENOUS ONCE
Status: COMPLETED | OUTPATIENT
Start: 2017-09-07 | End: 2017-09-07

## 2017-09-07 RX ORDER — LIDOCAINE HYDROCHLORIDE 20 MG/ML
JELLY TOPICAL AS NEEDED
Status: DISCONTINUED | OUTPATIENT
Start: 2017-09-07 | End: 2017-09-07 | Stop reason: HOSPADM

## 2017-09-07 RX ORDER — FENTANYL CITRATE 50 UG/ML
25 INJECTION, SOLUTION INTRAMUSCULAR; INTRAVENOUS
Status: ACTIVE | OUTPATIENT
Start: 2017-09-07 | End: 2017-09-07

## 2017-09-07 RX ORDER — SODIUM CHLORIDE 0.9 % (FLUSH) 0.9 %
5-10 SYRINGE (ML) INJECTION AS NEEDED
Status: CANCELLED | OUTPATIENT
Start: 2017-09-07

## 2017-09-07 RX ORDER — FENTANYL CITRATE 50 UG/ML
INJECTION, SOLUTION INTRAMUSCULAR; INTRAVENOUS AS NEEDED
Status: DISCONTINUED | OUTPATIENT
Start: 2017-09-07 | End: 2017-09-07 | Stop reason: HOSPADM

## 2017-09-07 RX ORDER — SODIUM CHLORIDE 0.9 % (FLUSH) 0.9 %
5-10 SYRINGE (ML) INJECTION AS NEEDED
Status: DISCONTINUED | OUTPATIENT
Start: 2017-09-07 | End: 2017-09-07 | Stop reason: HOSPADM

## 2017-09-07 RX ORDER — NALOXONE HYDROCHLORIDE 0.4 MG/ML
0.1 INJECTION, SOLUTION INTRAMUSCULAR; INTRAVENOUS; SUBCUTANEOUS
Status: DISCONTINUED | OUTPATIENT
Start: 2017-09-07 | End: 2017-09-07 | Stop reason: HOSPADM

## 2017-09-07 RX ORDER — INSULIN LISPRO 100 [IU]/ML
INJECTION, SOLUTION INTRAVENOUS; SUBCUTANEOUS ONCE
Status: DISCONTINUED | OUTPATIENT
Start: 2017-09-07 | End: 2017-09-07 | Stop reason: HOSPADM

## 2017-09-07 RX ORDER — SODIUM CHLORIDE, SODIUM LACTATE, POTASSIUM CHLORIDE, CALCIUM CHLORIDE 600; 310; 30; 20 MG/100ML; MG/100ML; MG/100ML; MG/100ML
50 INJECTION, SOLUTION INTRAVENOUS CONTINUOUS
Status: DISCONTINUED | OUTPATIENT
Start: 2017-09-07 | End: 2017-09-07 | Stop reason: HOSPADM

## 2017-09-07 RX ORDER — GLYCOPYRROLATE 0.2 MG/ML
INJECTION INTRAMUSCULAR; INTRAVENOUS AS NEEDED
Status: DISCONTINUED | OUTPATIENT
Start: 2017-09-07 | End: 2017-09-07 | Stop reason: HOSPADM

## 2017-09-07 RX ORDER — DEXTROSE 50 % IN WATER (D50W) INTRAVENOUS SYRINGE
25-50 AS NEEDED
Status: DISCONTINUED | OUTPATIENT
Start: 2017-09-07 | End: 2017-09-07 | Stop reason: HOSPADM

## 2017-09-07 RX ORDER — SODIUM CHLORIDE, SODIUM LACTATE, POTASSIUM CHLORIDE, CALCIUM CHLORIDE 600; 310; 30; 20 MG/100ML; MG/100ML; MG/100ML; MG/100ML
125 INJECTION, SOLUTION INTRAVENOUS CONTINUOUS
Status: DISCONTINUED | OUTPATIENT
Start: 2017-09-07 | End: 2017-09-07 | Stop reason: HOSPADM

## 2017-09-07 RX ORDER — MIDAZOLAM HYDROCHLORIDE 1 MG/ML
INJECTION, SOLUTION INTRAMUSCULAR; INTRAVENOUS AS NEEDED
Status: DISCONTINUED | OUTPATIENT
Start: 2017-09-07 | End: 2017-09-07 | Stop reason: HOSPADM

## 2017-09-07 RX ORDER — SODIUM CHLORIDE 0.9 % (FLUSH) 0.9 %
5-10 SYRINGE (ML) INJECTION EVERY 8 HOURS
Status: CANCELLED | OUTPATIENT
Start: 2017-09-07

## 2017-09-07 RX ORDER — OXYCODONE AND ACETAMINOPHEN 5; 325 MG/1; MG/1
1 TABLET ORAL AS NEEDED
Status: DISCONTINUED | OUTPATIENT
Start: 2017-09-07 | End: 2017-09-07 | Stop reason: HOSPADM

## 2017-09-07 RX ORDER — ONDANSETRON 2 MG/ML
4 INJECTION INTRAMUSCULAR; INTRAVENOUS ONCE
Status: DISCONTINUED | OUTPATIENT
Start: 2017-09-07 | End: 2017-09-07 | Stop reason: HOSPADM

## 2017-09-07 RX ORDER — HYDROMORPHONE HYDROCHLORIDE 2 MG/ML
0.5 INJECTION, SOLUTION INTRAMUSCULAR; INTRAVENOUS; SUBCUTANEOUS
Status: DISCONTINUED | OUTPATIENT
Start: 2017-09-07 | End: 2017-09-07 | Stop reason: HOSPADM

## 2017-09-07 RX ADMIN — PROPOFOL 10 MG: 10 INJECTION, EMULSION INTRAVENOUS at 09:34

## 2017-09-07 RX ADMIN — PROPOFOL 10 MG: 10 INJECTION, EMULSION INTRAVENOUS at 09:21

## 2017-09-07 RX ADMIN — PROPOFOL 10 MG: 10 INJECTION, EMULSION INTRAVENOUS at 09:19

## 2017-09-07 RX ADMIN — PROPOFOL 10 MG: 10 INJECTION, EMULSION INTRAVENOUS at 08:59

## 2017-09-07 RX ADMIN — PROPOFOL 10 MG: 10 INJECTION, EMULSION INTRAVENOUS at 08:05

## 2017-09-07 RX ADMIN — PROPOFOL 10 MG: 10 INJECTION, EMULSION INTRAVENOUS at 08:21

## 2017-09-07 RX ADMIN — PROPOFOL 20 MG: 10 INJECTION, EMULSION INTRAVENOUS at 08:38

## 2017-09-07 RX ADMIN — PROPOFOL 10 MG: 10 INJECTION, EMULSION INTRAVENOUS at 09:31

## 2017-09-07 RX ADMIN — FENTANYL CITRATE 25 MCG: 50 INJECTION, SOLUTION INTRAMUSCULAR; INTRAVENOUS at 07:38

## 2017-09-07 RX ADMIN — PROPOFOL 10 MG: 10 INJECTION, EMULSION INTRAVENOUS at 08:09

## 2017-09-07 RX ADMIN — PROPOFOL 20 MG: 10 INJECTION, EMULSION INTRAVENOUS at 08:00

## 2017-09-07 RX ADMIN — PROPOFOL 10 MG: 10 INJECTION, EMULSION INTRAVENOUS at 09:05

## 2017-09-07 RX ADMIN — PROPOFOL 10 MG: 10 INJECTION, EMULSION INTRAVENOUS at 09:30

## 2017-09-07 RX ADMIN — SODIUM CHLORIDE, SODIUM LACTATE, POTASSIUM CHLORIDE, AND CALCIUM CHLORIDE: 600; 310; 30; 20 INJECTION, SOLUTION INTRAVENOUS at 08:30

## 2017-09-07 RX ADMIN — PROPOFOL 10 MG: 10 INJECTION, EMULSION INTRAVENOUS at 09:10

## 2017-09-07 RX ADMIN — PROPOFOL 10 MG: 10 INJECTION, EMULSION INTRAVENOUS at 08:53

## 2017-09-07 RX ADMIN — PROPOFOL 10 MG: 10 INJECTION, EMULSION INTRAVENOUS at 08:24

## 2017-09-07 RX ADMIN — PROPOFOL 10 MG: 10 INJECTION, EMULSION INTRAVENOUS at 08:14

## 2017-09-07 RX ADMIN — PROPOFOL 10 MG: 10 INJECTION, EMULSION INTRAVENOUS at 07:55

## 2017-09-07 RX ADMIN — PROPOFOL 10 MG: 10 INJECTION, EMULSION INTRAVENOUS at 09:26

## 2017-09-07 RX ADMIN — PROPOFOL 20 MG: 10 INJECTION, EMULSION INTRAVENOUS at 08:06

## 2017-09-07 RX ADMIN — PROPOFOL 10 MG: 10 INJECTION, EMULSION INTRAVENOUS at 08:26

## 2017-09-07 RX ADMIN — PROPOFOL 10 MG: 10 INJECTION, EMULSION INTRAVENOUS at 08:49

## 2017-09-07 RX ADMIN — PROPOFOL 10 MG: 10 INJECTION, EMULSION INTRAVENOUS at 09:32

## 2017-09-07 RX ADMIN — PROPOFOL 10 MG: 10 INJECTION, EMULSION INTRAVENOUS at 09:08

## 2017-09-07 RX ADMIN — PROPOFOL 10 MG: 10 INJECTION, EMULSION INTRAVENOUS at 09:27

## 2017-09-07 RX ADMIN — PROPOFOL 10 MG: 10 INJECTION, EMULSION INTRAVENOUS at 08:33

## 2017-09-07 RX ADMIN — PROPOFOL 10 MG: 10 INJECTION, EMULSION INTRAVENOUS at 09:33

## 2017-09-07 RX ADMIN — PROPOFOL 10 MG: 10 INJECTION, EMULSION INTRAVENOUS at 08:30

## 2017-09-07 RX ADMIN — PROPOFOL 10 MG: 10 INJECTION, EMULSION INTRAVENOUS at 08:22

## 2017-09-07 RX ADMIN — PROPOFOL 10 MG: 10 INJECTION, EMULSION INTRAVENOUS at 08:57

## 2017-09-07 RX ADMIN — PROPOFOL 10 MG: 10 INJECTION, EMULSION INTRAVENOUS at 08:03

## 2017-09-07 RX ADMIN — PROPOFOL 10 MG: 10 INJECTION, EMULSION INTRAVENOUS at 08:28

## 2017-09-07 RX ADMIN — PROPOFOL 10 MG: 10 INJECTION, EMULSION INTRAVENOUS at 08:47

## 2017-09-07 RX ADMIN — PROPOFOL 10 MG: 10 INJECTION, EMULSION INTRAVENOUS at 08:55

## 2017-09-07 RX ADMIN — SODIUM CHLORIDE, SODIUM LACTATE, POTASSIUM CHLORIDE, AND CALCIUM CHLORIDE 125 ML/HR: 600; 310; 30; 20 INJECTION, SOLUTION INTRAVENOUS at 06:29

## 2017-09-07 RX ADMIN — PROPOFOL 30 MG: 10 INJECTION, EMULSION INTRAVENOUS at 07:36

## 2017-09-07 RX ADMIN — PROPOFOL 10 MG: 10 INJECTION, EMULSION INTRAVENOUS at 09:18

## 2017-09-07 RX ADMIN — PROPOFOL 10 MG: 10 INJECTION, EMULSION INTRAVENOUS at 09:23

## 2017-09-07 RX ADMIN — PROPOFOL 20 MG: 10 INJECTION, EMULSION INTRAVENOUS at 07:52

## 2017-09-07 RX ADMIN — PROPOFOL 10 MG: 10 INJECTION, EMULSION INTRAVENOUS at 08:45

## 2017-09-07 RX ADMIN — PROPOFOL 10 MG: 10 INJECTION, EMULSION INTRAVENOUS at 08:44

## 2017-09-07 RX ADMIN — PROPOFOL 10 MG: 10 INJECTION, EMULSION INTRAVENOUS at 08:43

## 2017-09-07 RX ADMIN — PROPOFOL 10 MG: 10 INJECTION, EMULSION INTRAVENOUS at 08:56

## 2017-09-07 RX ADMIN — PROPOFOL 20 MG: 10 INJECTION, EMULSION INTRAVENOUS at 07:42

## 2017-09-07 RX ADMIN — ONDANSETRON 4 MG: 2 INJECTION INTRAMUSCULAR; INTRAVENOUS at 07:51

## 2017-09-07 RX ADMIN — PROPOFOL 10 MG: 10 INJECTION, EMULSION INTRAVENOUS at 08:23

## 2017-09-07 RX ADMIN — PROPOFOL 10 MG: 10 INJECTION, EMULSION INTRAVENOUS at 08:52

## 2017-09-07 RX ADMIN — PROPOFOL 10 MG: 10 INJECTION, EMULSION INTRAVENOUS at 09:20

## 2017-09-07 RX ADMIN — PROPOFOL 20 MG: 10 INJECTION, EMULSION INTRAVENOUS at 08:02

## 2017-09-07 RX ADMIN — PROPOFOL 20 MG: 10 INJECTION, EMULSION INTRAVENOUS at 07:58

## 2017-09-07 RX ADMIN — PROPOFOL 10 MG: 10 INJECTION, EMULSION INTRAVENOUS at 08:54

## 2017-09-07 RX ADMIN — GLYCOPYRROLATE 0.1 MG: 0.2 INJECTION INTRAMUSCULAR; INTRAVENOUS at 07:59

## 2017-09-07 RX ADMIN — PROPOFOL 10 MG: 10 INJECTION, EMULSION INTRAVENOUS at 09:11

## 2017-09-07 RX ADMIN — PROPOFOL 10 MG: 10 INJECTION, EMULSION INTRAVENOUS at 09:02

## 2017-09-07 RX ADMIN — PROPOFOL 20 MG: 10 INJECTION, EMULSION INTRAVENOUS at 08:40

## 2017-09-07 RX ADMIN — PROPOFOL 10 MG: 10 INJECTION, EMULSION INTRAVENOUS at 09:07

## 2017-09-07 RX ADMIN — PROPOFOL 10 MG: 10 INJECTION, EMULSION INTRAVENOUS at 09:25

## 2017-09-07 RX ADMIN — PROPOFOL 20 MG: 10 INJECTION, EMULSION INTRAVENOUS at 07:40

## 2017-09-07 RX ADMIN — PROPOFOL 10 MG: 10 INJECTION, EMULSION INTRAVENOUS at 07:59

## 2017-09-07 RX ADMIN — PROPOFOL 10 MG: 10 INJECTION, EMULSION INTRAVENOUS at 08:58

## 2017-09-07 RX ADMIN — PROPOFOL 10 MG: 10 INJECTION, EMULSION INTRAVENOUS at 08:19

## 2017-09-07 RX ADMIN — PROPOFOL 10 MG: 10 INJECTION, EMULSION INTRAVENOUS at 08:32

## 2017-09-07 RX ADMIN — PROPOFOL 10 MG: 10 INJECTION, EMULSION INTRAVENOUS at 08:01

## 2017-09-07 RX ADMIN — PROPOFOL 10 MG: 10 INJECTION, EMULSION INTRAVENOUS at 08:35

## 2017-09-07 RX ADMIN — PROPOFOL 10 MG: 10 INJECTION, EMULSION INTRAVENOUS at 09:13

## 2017-09-07 RX ADMIN — LIDOCAINE HYDROCHLORIDE 2 ML: 20 JELLY TOPICAL at 08:02

## 2017-09-07 RX ADMIN — PROPOFOL 10 MG: 10 INJECTION, EMULSION INTRAVENOUS at 09:00

## 2017-09-07 RX ADMIN — PROPOFOL 10 MG: 10 INJECTION, EMULSION INTRAVENOUS at 08:10

## 2017-09-07 RX ADMIN — PROPOFOL 20 MG: 10 INJECTION, EMULSION INTRAVENOUS at 07:46

## 2017-09-07 RX ADMIN — PROPOFOL 20 MG: 10 INJECTION, EMULSION INTRAVENOUS at 07:50

## 2017-09-07 RX ADMIN — PROPOFOL 10 MG: 10 INJECTION, EMULSION INTRAVENOUS at 08:41

## 2017-09-07 RX ADMIN — PROPOFOL 10 MG: 10 INJECTION, EMULSION INTRAVENOUS at 08:37

## 2017-09-07 RX ADMIN — PROPOFOL 10 MG: 10 INJECTION, EMULSION INTRAVENOUS at 08:27

## 2017-09-07 RX ADMIN — PROPOFOL 10 MG: 10 INJECTION, EMULSION INTRAVENOUS at 08:20

## 2017-09-07 RX ADMIN — CEFAZOLIN SODIUM 2 G: 2 SOLUTION INTRAVENOUS at 07:40

## 2017-09-07 RX ADMIN — LIDOCAINE HYDROCHLORIDE 40 MG: 20 INJECTION, SOLUTION EPIDURAL; INFILTRATION; INTRACAUDAL; PERINEURAL at 07:36

## 2017-09-07 RX ADMIN — PROPOFOL 10 MG: 10 INJECTION, EMULSION INTRAVENOUS at 09:22

## 2017-09-07 RX ADMIN — PROPOFOL 20 MG: 10 INJECTION, EMULSION INTRAVENOUS at 07:54

## 2017-09-07 RX ADMIN — PROPOFOL 20 MG: 10 INJECTION, EMULSION INTRAVENOUS at 07:56

## 2017-09-07 RX ADMIN — PROPOFOL 10 MG: 10 INJECTION, EMULSION INTRAVENOUS at 09:29

## 2017-09-07 RX ADMIN — PROPOFOL 20 MG: 10 INJECTION, EMULSION INTRAVENOUS at 07:38

## 2017-09-07 RX ADMIN — FENTANYL CITRATE 25 MCG: 50 INJECTION, SOLUTION INTRAMUSCULAR; INTRAVENOUS at 07:44

## 2017-09-07 RX ADMIN — PROPOFOL 10 MG: 10 INJECTION, EMULSION INTRAVENOUS at 08:50

## 2017-09-07 RX ADMIN — PROPOFOL 10 MG: 10 INJECTION, EMULSION INTRAVENOUS at 08:12

## 2017-09-07 RX ADMIN — PROPOFOL 10 MG: 10 INJECTION, EMULSION INTRAVENOUS at 07:37

## 2017-09-07 RX ADMIN — PROPOFOL 10 MG: 10 INJECTION, EMULSION INTRAVENOUS at 07:47

## 2017-09-07 RX ADMIN — PROPOFOL 10 MG: 10 INJECTION, EMULSION INTRAVENOUS at 08:51

## 2017-09-07 RX ADMIN — PROPOFOL 10 MG: 10 INJECTION, EMULSION INTRAVENOUS at 08:15

## 2017-09-07 RX ADMIN — PROPOFOL 10 MG: 10 INJECTION, EMULSION INTRAVENOUS at 08:39

## 2017-09-07 RX ADMIN — PROPOFOL 10 MG: 10 INJECTION, EMULSION INTRAVENOUS at 08:48

## 2017-09-07 RX ADMIN — GLYCOPYRROLATE 0.1 MG: 0.2 INJECTION INTRAMUSCULAR; INTRAVENOUS at 07:51

## 2017-09-07 RX ADMIN — PROPOFOL 10 MG: 10 INJECTION, EMULSION INTRAVENOUS at 09:14

## 2017-09-07 RX ADMIN — PROPOFOL 10 MG: 10 INJECTION, EMULSION INTRAVENOUS at 08:46

## 2017-09-07 RX ADMIN — PROPOFOL 10 MG: 10 INJECTION, EMULSION INTRAVENOUS at 08:34

## 2017-09-07 RX ADMIN — PROPOFOL 10 MG: 10 INJECTION, EMULSION INTRAVENOUS at 08:07

## 2017-09-07 RX ADMIN — PROPOFOL 10 MG: 10 INJECTION, EMULSION INTRAVENOUS at 08:18

## 2017-09-07 RX ADMIN — PROPOFOL 10 MG: 10 INJECTION, EMULSION INTRAVENOUS at 09:17

## 2017-09-07 RX ADMIN — PROPOFOL 10 MG: 10 INJECTION, EMULSION INTRAVENOUS at 07:53

## 2017-09-07 RX ADMIN — FENTANYL CITRATE 25 MCG: 50 INJECTION, SOLUTION INTRAMUSCULAR; INTRAVENOUS at 09:05

## 2017-09-07 RX ADMIN — PROPOFOL 10 MG: 10 INJECTION, EMULSION INTRAVENOUS at 07:51

## 2017-09-07 RX ADMIN — PROPOFOL 10 MG: 10 INJECTION, EMULSION INTRAVENOUS at 09:16

## 2017-09-07 RX ADMIN — PROPOFOL 10 MG: 10 INJECTION, EMULSION INTRAVENOUS at 08:11

## 2017-09-07 RX ADMIN — MIDAZOLAM HYDROCHLORIDE 2 MG: 1 INJECTION, SOLUTION INTRAMUSCULAR; INTRAVENOUS at 07:29

## 2017-09-07 RX ADMIN — PROPOFOL 20 MG: 10 INJECTION, EMULSION INTRAVENOUS at 08:04

## 2017-09-07 RX ADMIN — PROPOFOL 20 MG: 10 INJECTION, EMULSION INTRAVENOUS at 08:42

## 2017-09-07 RX ADMIN — PROPOFOL 10 MG: 10 INJECTION, EMULSION INTRAVENOUS at 09:03

## 2017-09-07 RX ADMIN — PROPOFOL 10 MG: 10 INJECTION, EMULSION INTRAVENOUS at 08:29

## 2017-09-07 RX ADMIN — PROPOFOL 10 MG: 10 INJECTION, EMULSION INTRAVENOUS at 09:01

## 2017-09-07 RX ADMIN — PROPOFOL 10 MG: 10 INJECTION, EMULSION INTRAVENOUS at 07:44

## 2017-09-07 RX ADMIN — PROPOFOL 10 MG: 10 INJECTION, EMULSION INTRAVENOUS at 09:06

## 2017-09-07 RX ADMIN — PROPOFOL 10 MG: 10 INJECTION, EMULSION INTRAVENOUS at 08:36

## 2017-09-07 RX ADMIN — PROPOFOL 10 MG: 10 INJECTION, EMULSION INTRAVENOUS at 09:24

## 2017-09-07 RX ADMIN — PROPOFOL 10 MG: 10 INJECTION, EMULSION INTRAVENOUS at 08:17

## 2017-09-07 RX ADMIN — PROPOFOL 10 MG: 10 INJECTION, EMULSION INTRAVENOUS at 09:09

## 2017-09-07 RX ADMIN — PROPOFOL 10 MG: 10 INJECTION, EMULSION INTRAVENOUS at 09:04

## 2017-09-07 RX ADMIN — PROPOFOL 10 MG: 10 INJECTION, EMULSION INTRAVENOUS at 09:15

## 2017-09-07 RX ADMIN — PROPOFOL 10 MG: 10 INJECTION, EMULSION INTRAVENOUS at 09:28

## 2017-09-07 RX ADMIN — PROPOFOL 10 MG: 10 INJECTION, EMULSION INTRAVENOUS at 08:31

## 2017-09-07 RX ADMIN — FENTANYL CITRATE 25 MCG: 50 INJECTION, SOLUTION INTRAMUSCULAR; INTRAVENOUS at 07:32

## 2017-09-07 RX ADMIN — PROPOFOL 10 MG: 10 INJECTION, EMULSION INTRAVENOUS at 08:25

## 2017-09-07 RX ADMIN — PROPOFOL 10 MG: 10 INJECTION, EMULSION INTRAVENOUS at 08:08

## 2017-09-07 RX ADMIN — PROPOFOL 10 MG: 10 INJECTION, EMULSION INTRAVENOUS at 08:13

## 2017-09-07 RX ADMIN — PROPOFOL 10 MG: 10 INJECTION, EMULSION INTRAVENOUS at 08:16

## 2017-09-07 RX ADMIN — PROPOFOL 10 MG: 10 INJECTION, EMULSION INTRAVENOUS at 07:48

## 2017-09-07 RX ADMIN — PROPOFOL 10 MG: 10 INJECTION, EMULSION INTRAVENOUS at 09:12

## 2017-09-07 NOTE — OP NOTES
OPERATIVE NOTE - First and Second-Stage Interstim II Implantation with Programming    Patient: Duc Jolly MRN: [id number]  SSN: [id number]    YOB: 1970  Age: 49 y.o. Sex: female      Date of Procedure:  9/7/2017   Preoperative Diagnosis:  FREQUENCY OF MICTURITION  Postoperative Diagnosis:  FREQUENCY OF MICTURITION    Procedure:  Procedure(s):  INTERSTIM PLACEMENT STAGES 1 & 2 W/C-ARM  Surgeon:  Surgeon(s) and Role:     * Sujit Alvarez MD - Primary  Anesthesia:  MAC     Findings:  Urinary frequency    Procedure Details:    After informed consent was obtained, the patient was taken to the operating room, placed in the prone position on the [employer] Vlad table. The pt was prepped and draped in usual surgical fashion. Tape was placed on each buttock and pulled laterally to help in visualization of the anal sphincter. A  was used to confirm the patient was level. The patient was then prepped and draped in usual surgical fashion. The C-arm was moved into the lateral position to image the area of the sacral promontory to the coccyx. We then obtained an AP view to identify the superior medial aspect of S3. This was then marked on the skin level on the LEFT side. Next, the skin was anesthetized with 0.25% Marcaine:1% Lidocaine without epinephrine. The anesthetic was placed at the skin insertion point and at the periosteal level for the lead insertion, as well as preparing the pocket for the neurostimulator. A foramen needle was then inserted at the marked position parallel to the foraminal line. It entered the S3 foramen after several attempts on both the right and left side. Depth of the needle was confirmed with lateral fluoroscopy. Proper needle position was confirmed by direct observation of lifting of the perineum or \"bellowing\" and the observance of plantar flexion of the great toe using the test stimulator box.  The needle stylet was removed, and a directional guide was placed, confirming its placement and depth on fluoroscopy. The foramen needle was then removed. An incision was made laterally from the directional guide through the skin, and then a dilator and introducer sheath was placed over the directional guide and directed into the foramen, until the opaque marker of the dilator was seen at the midline the of the sacrum. The dilator obturator was unlocked and removed along with the directional guide. The 5245-35 tined lead with a curved stylet, was then placed through the introducer sheath so that lead 2 and 3 straddled the anterior margin of the bone. Position was confirmed by fluoroscopy. The lead was then further introduced, until three electrodes were visible below the sacrum, and one electrode within the sacrum. Each electrode was tested for visualization of finn and plantar flexion of the great toe. We had good responses on all four leads both plantar flexion and finn response. After satisfactory positioning was confirmed both AP and lateral views, the introducer sheath was retracted under continuous fluoroscopy, deploying the tined leads into the presacral tissue. Leads were then retested to confirm appropriate motor response. Next, attention was drawn to the placement of the InterStim II generator device. An incision was made in the subcutaneous tissue, posterior to the iliac crest to the depth of the gluteal fascia on the LEFT side. Blunt, sharp and electrocautery dissection was used to create a pocket sufficient in volume to  the lead, subcutaneously, to the pocket site. The tunneling tool was removed, and the lead was fed through the tube and pulled out of the pocket site. The wound was copiously irrigated with antibiotic solution. The tined lead was dried and was inserted into the InterStim II generator, until the blue tip of the lead was visible in the window. A hex wrench was then used to tighten the set screw down and it was tugged on and felt to be secure.    The InterStim II generator was placed in the subcutaneous pocket and laid in nicely. Next, a blue towel was placed over the incision, and the programming head was placed over the implanted neurostimulator. The impedances were verified to be greater than 50 Ohms and less than 4000 Ohms. After implantation of the InterStim II generator was completed, the complex programming of the neurostimulator was performed. The wound was closed in 2 layers; a running 3-0 Vicryl for Jonis layer, and a running subcuticular 4-0 Vicryl for the skin. All layers were irrigated in between. Excellent hemostasis was obtained. The insertion site in the sacrum was also closed with a single 4-0 Vicryl suture. The wounds were then washed and dried. Dermabond was placed on them, and the procedure ended. All sponge, needle, and instrument counts were correct x2. The patient was placed in the supine position on the stretcher, transferred to the recovery room awake, alert, in stable condition. Estimated Blood Loss:  Minimal  Specimens:  * No specimens in log *   Implants:    Implant Name Type Inv.  Item Serial No.  Lot No. LRB No. Used Action   GENERATOR INTERSTIM II IPG --  - [id number]  GENERATOR INTERSTIM II IPG --  [id number] [facility]  N/A 1 Implanted   LEAD KT STIM INTERSTIM 28CM --  - [id number]   LEAD KT STIM INTERSTIM 28CM --    [employer] [id number] N/A 1 Implanted       Complications:  None    Micaela Montez MD  9/7/2017  9:37 AM

## 2017-09-07 NOTE — ANESTHESIA POSTPROCEDURE EVALUATION
Post-Anesthesia Evaluation and Assessment    Cardiovascular Function/Vital Signs  Visit Vitals    /59    Pulse 68    Temp 37.2 °C (98.9 °F)    Resp 15    Ht 5' 6\" (1.676 m)    Wt 94.4 kg (208 lb 3 oz)    SpO2 100%    BMI 33.6 kg/m2       Patient is status post Procedure(s):  INTERSTIM PLACEMENT STAGES 1 & 2 W/C-ARM. Nausea/Vomiting: Controlled. Postoperative hydration reviewed and adequate. Pain:  Pain Scale 1: Numeric (0 - 10) (09/07/17 0946)  Pain Intensity 1: 0 (09/07/17 0946)   Managed. Neurological Status:   Neuro (WDL): Within Defined Limits (09/07/17 0946)   At baseline. Mental Status and Level of Consciousness: Baseline and stable. Pulmonary Status:   O2 Device: Room air (09/07/17 1008)   Adequate oxygenation and airway patent. Complications related to anesthesia: None    Post-anesthesia assessment completed. No concerns. Patient has met all discharge requirements.     Signed By: Caro Baldwin MD

## 2017-09-07 NOTE — DISCHARGE INSTRUCTIONS
Jason Landry. Josie Gomez M.D. [facility]  [address], [address] [place], [place]  Office: [phone]  Fax:    [phone]: Procedure(s):  INTERSTIM PLACEMENT STAGES 1 & 2 W/C-ARM    Notify [facility] IMMEDIATELY if any of the following occur:     You are unable to urinate. Urgency to urinate is not uncommon.  You find yourself urinating small frequent amounts associated with severe lower abdominal discomfort.  Bright red blood with clots in the urine. Some reddish urine is not uncommon and should be treated with increasing the amount of fluids you drink.  Temperature above 101.5° and / or chills.  You are nauseous and / or vomiting and you cannot hold down any fluids.  Your pain is not controlled with the pain medication prescribed. Special Considerations:      Do not drive for at least 24 hours after the procedure and until you are no longer taking narcotic pain medication and you are able to move and react without hesitation. MEDICATIONS:  Pain   [x]  Norco®   []  Percocet® []  Dilaudid®       Antibiotics   []  Cipro   [x]  Keflex   [] Levaquin   []  Bactrim DS®       Urgency   []  Vesicare®   []       Burning   []  Pyridium®   []   UribelTM     Nausea   []  Zofran®   []    Phenergan®     Miscellaneous         [x] Prescriptions Written on Chart    [] Prescriptions sent Electronically           Our office will call you tomorrow to schedule your first follow-up appointment. Please contact [place] [address] [facility] at [phone] or go to the nearest Emergency Department / Urgent Care facility for any other medical questions or concerns.     Patient armband removed and shredded    DISCHARGE SUMMARY from Nurse    The following personal items are in your possession at time of discharge:    Dental Appliances: None  Visual Aid: Glasses     Home Medications: None  Jewelry: None  Clothing: Pants, Shirt, Footwear, Undergarments (with spouse)  Other Valuables: Eyeglasses (with spouse)             PATIENT INSTRUCTIONS:    After general anesthesia or intravenous sedation, for 24 hours or while taking prescription Narcotics:  · Limit your activities  · Do not drive and operate hazardous machinery  · Do not make important personal or business decisions  · Do  not drink alcoholic beverages  · If you have not urinated within 8 hours after discharge, please contact your surgeon on call. Report the following to your surgeon:  · Excessive pain, swelling, redness or odor of or around the surgical area  · Temperature over 100.5  · Nausea and vomiting lasting longer than 4 hours or if unable to take medications  · Any signs of decreased circulation or nerve impairment to extremity: change in color, persistent  numbness, tingling, coldness or increase pain  · Any questions        What to do at Home:  Recommended activity: Activity as tolerated and no driving for today and Ambulate in house,     If you experience any of the following symptoms above, please follow up with Dr. Kavya Acevedo. *  Please give a list of your current medications to your Primary Care Provider. *  Please update this list whenever your medications are discontinued, doses are      changed, or new medications (including over-the-counter products) are added. *  Please carry medication information at all times in case of emergency situations. These are general instructions for a healthy lifestyle:    No smoking/ No tobacco products/ Avoid exposure to second hand smoke    Surgeon General's Warning:  Quitting smoking now greatly reduces serious risk to your health.     Obesity, smoking, and sedentary lifestyle greatly increases your risk for illness    A healthy diet, regular physical exercise & weight monitoring are important for maintaining a healthy lifestyle    You may be retaining fluid if you have a history of heart failure or if you experience any of the following symptoms:  Weight gain of 3 pounds or more overnight or 5 pounds in a week, increased swelling in our hands or feet or shortness of breath while lying flat in bed. Please call your doctor as soon as you notice any of these symptoms; do not wait until your next office visit. Recognize signs and symptoms of STROKE:    F-face looks uneven    A-arms unable to move or move unevenly    S-speech slurred or non-existent    T-time-call 911 as soon as signs and symptoms begin-DO NOT go       Back to bed or wait to see if you get better-TIME IS BRAIN. Warning Signs of HEART ATTACK     Call 911 if you have these symptoms:   Chest discomfort. Most heart attacks involve discomfort in the center of the chest that lasts more than a few minutes, or that goes away and comes back. It can feel like uncomfortable pressure, squeezing, fullness, or pain.  Discomfort in other areas of the upper body. Symptoms can include pain or discomfort in one or both arms, the back, neck, jaw, or stomach.  Shortness of breath with or without chest discomfort.  Other signs may include breaking out in a cold sweat, nausea, or lightheadedness. Don't wait more than five minutes to call 911 - MINUTES MATTER! Fast action can save your life. Calling 911 is almost always the fastest way to get lifesaving treatment. Emergency Medical Services staff can begin treatment when they arrive -- up to an hour sooner than if someone gets to the hospital by car. The discharge information has been reviewed with the patient and spouse. The patient and spouse verbalized understanding. Discharge medications reviewed with the patient and spouse and appropriate educational materials and side effects teaching were provided.

## 2017-09-07 NOTE — PERIOP NOTES
TRANSFER - OUT REPORT:    Verbal report given to SANNA Toro RN on [facility]  being transferred to Phase 2 for routine post - op       Report consisted of patients Situation, Background, Assessment and   Recommendations(SBAR). Information from the following report(s) OR Summary, Procedure Summary, Intake/Output and MAR was reviewed with the receiving nurse. Lines:   Peripheral IV 09/07/17 Left Hand (Active)   Site Assessment Clean, dry, & intact 9/7/2017 11:03 AM   Phlebitis Assessment 0 9/7/2017 11:03 AM   Infiltration Assessment 0 9/7/2017 11:03 AM   Dressing Status Clean, dry, & intact 9/7/2017 11:03 AM   Dressing Type Tape;Transparent 9/7/2017 11:03 AM   Hub Color/Line Status Pink 9/7/2017 11:03 AM        Opportunity for questions and clarification was provided.       Patient transported with:   [employer]

## 2017-09-07 NOTE — IP AVS SNAPSHOT
[address] [address] Patient: Efrem Brown MRN: [id number] [employer] You are allergic to the following Allergen Reactions Seafood Rash Erythromycin Hives Recent Documentation Height Weight BMI Smoking Status 1.676 m 94.4 kg 33.6 kg/m2 Never Smoker Emergency Contacts Name Discharge Info Relation Home Work Mobile Feliz Santana DISCHARGE CAREGIVER [3] Spouse [3] [phone] About your hospitalization You were admitted on:  September 7, 2017 You last received care in the:  [facility] PHASE 2 RECOVERY You were discharged on:  September 7, 2017 Unit phone number:    
  
Why you were hospitalized Your primary diagnosis was:  Not on File Providers Seen During Your Hospitalizations Provider Role Specialty Primary office phone Ramón Khalil MD Attending Provider Urology [phone] Your Primary Care Physician (PCP) Primary Care Physician Office Phone Office Fax [address] [facility] [phone] Follow-up Information Follow up With Details Comments Contact Info Marybeth Schaffer, [address] [address] [address] [place] [address] 
[phone] Current Discharge Medication List  
  
CONTINUE these medications which have NOT CHANGED Dose & Instructions Dispensing Information Comments Morning Noon Evening Bedtime  
 atenolol 25 mg tablet Commonly known as:  TENORMIN Your last dose was: Your next dose is:    
   
   
 Dose:  25 mg Take 25 mg by mouth daily. Indications: hypertension Refills:  0  
     
   
   
   
  
 ORTHO-NOVUM 1/35 (28) 1-35 mg-mcg Tab Generic drug:  norethindrone-ethinyl estradiol Your last dose was: Your next dose is: Take  by mouth. Refills:  0 Discharge Instructions Audrey Felix. Maddy Doyle M.D. [address] Office: [phone] Fax:    [phone] PROCEDURE: Procedure(s): INTERSTIM PLACEMENT STAGES 1 & 2 W/C-ARM Notify [facility] IMMEDIATELY if any of the following occur: ? You are unable to urinate. Urgency to urinate is not uncommon. ? You find yourself urinating small frequent amounts associated with severe lower abdominal discomfort. ? Bright red blood with clots in the urine. Some reddish urine is not uncommon and should be treated with increasing the amount of fluids you drink. ? Temperature above 101.5° and / or chills. ? You are nauseous and / or vomiting and you cannot hold down any fluids. ? Your pain is not controlled with the pain medication prescribed. Special Considerations:  
 
? Do not drive for at least 24 hours after the procedure and until you are no longer taking narcotic pain medication and you are able to move and react without hesitation. MEDICATIONS: 
Pain     Norco®     Percocet®   Dilaudid® Antibiotics     Cipro     Keflex    Levaquin     Bactrim DS® Urgency     Vesicare® Burning     Pyridium®      Roxene Mask Nausea     Zofran®       Phenergan® Miscellaneous Prescriptions Written on Chart Prescriptions sent Electronically Our office will call you tomorrow to schedule your first follow-up appointment. Please contact [facility] at [phone] or go to the nearest Emergency Department / Urgent Care facility for any other medical questions or concerns. Patient armband removed and shredded DISCHARGE SUMMARY from Nurse The following personal items are in your possession at time of discharge: 
 
Dental Appliances: None Visual Aid: Glasses Home Medications: None Jewelry: None Clothing: Pants, Shirt, Footwear, Undergarments (with spouse) Other Valuables: Eyeglasses (with spouse) PATIENT INSTRUCTIONS: 
 
 
F-face looks uneven A-arms unable to move or move unevenly S-speech slurred or non-existent T-time-call 911 as soon as signs and symptoms begin-DO NOT go Back to bed or wait to see if you get better-TIME IS BRAIN. Warning Signs of HEART ATTACK Call 911 if you have these symptoms: 
? Chest discomfort. Most heart attacks involve discomfort in the center of the chest that lasts more than a few minutes, or that goes away and comes back. It can feel like uncomfortable pressure, squeezing, fullness, or pain. ? Discomfort in other areas of the upper body. Symptoms can include pain or discomfort in one or both arms, the back, neck, jaw, or stomach. ? Shortness of breath with or without chest discomfort. ? Other signs may include breaking out in a cold sweat, nausea, or lightheadedness. Don't wait more than five minutes to call [address]! Fast action can save your life. Calling 911 is almost always the fastest way to get lifesaving treatment. Emergency Medical Services staff can begin treatment when they arrive  up to an hour sooner than if someone gets to the hospital by car. The discharge information has been reviewed with the patient and spouse. The patient and spouse verbalized understanding. Discharge medications reviewed with the patient and spouse and appropriate educational materials and side effects teaching were provided. Discharge Orders None Introducing hospitals & HEALTH SERVICES! Jerson Flannery introduces [employer] patient portal. Now you can access parts of your medical record, email your doctor's office, and request medication refills online.    
 
1. In your internet browser, go to [email or web address] 2. Click on the First Time User? Click Here link in the Sign In box. You will see the New Member Sign Up page. 3. Enter your [employer] Access Code exactly as it appears below. You will not need to use this code after youve completed the sign-up process. If you do not sign up before the expiration date, you must request a new code. · [employer] Access Code: [email or web address] Expires: 10/5/2017 11:15 AM 
 
4. Enter the last four digits of your Social Security Number (xxxx) and Date of Birth (mm/dd/yyyy) as indicated and click Submit. You will be taken to the next sign-up page. 5. Create a Workpop ID. This will be your Workpop login ID and cannot be changed, so think of one that is secure and easy to remember. 6. Create a Work[employer] password. You can change your password at any time. 7. Enter your Password Reset Question and Answer. This can be used at a later time if you forget your password. 8. Enter your e-mail address. You will receive e-mail notification when new information is available in [address] 9. Click Sign Up. You can now view and download portions of your medical record. 10. Click the Download Summary menu link to download a portable copy of your medical information. If you have questions, please visit the Frequently Asked Questions section of the [employer] website. Remember, Work[employer] is NOT to be used for urgent needs. For medical emergencies, dial 911. Now available from your iPhone and Android! General Information Please provide this summary of care documentation to your next provider. Patient Signature:  ____________________________________________________________ Date:  ____________________________________________________________  
  
Fayrene Retort Provider Signature:  ____________________________________________________________ Date:  ____________________________________________________________

## 2017-09-07 NOTE — ANESTHESIA PREPROCEDURE EVALUATION
Anesthetic History   No history of anesthetic complications            Review of Systems / Medical History  Patient summary reviewed, nursing notes reviewed and pertinent labs reviewed    Pulmonary  Within defined limits                 Neuro/Psych   Within defined limits           Cardiovascular    Hypertension                   GI/Hepatic/Renal  Within defined limits              Endo/Other  Within defined limits           Other Findings              Physical Exam    Airway  Mallampati: II  TM Distance: 4 - 6 cm  Neck ROM: normal range of motion   Mouth opening: Normal     Cardiovascular  Regular rate and rhythm,  S1 and S2 normal,  no murmur, click, rub, or gallop             Dental  No notable dental hx       Pulmonary  Breath sounds clear to auscultation               Abdominal  Abdominal exam normal       Other Findings            Anesthetic Plan    ASA: 1  Anesthesia type: MAC          Induction: Intravenous  Anesthetic plan and risks discussed with: Family and Patient

## 2017-09-07 NOTE — ADDENDUM NOTE
Addendum  created 09/07/17 1056 by Carlos Manuel Goins, SUZETTE    Anesthesia Intra LDAs edited, LDA properties accepted

## 2017-09-07 NOTE — PERIOP NOTES
TRANSFER - IN REPORT:    Verbal report received from [address] [facility], Kyleigh Adame, and Cielo FINK on [place]  being received from OR for routine post - op      Report consisted of patients Situation, Background, Assessment and   Recommendations(SBAR). Information from the following report(s) OR Summary, Procedure Summary, Intake/Output and MAR was reviewed with the receiving nurse. Opportunity for questions and clarification was provided. Assessment completed upon patients arrival to unit and care assumed.

## 2017-09-07 NOTE — INTERVAL H&P NOTE
H&P Update:  Kaylyn tSarr was seen and examined. History and physical has been reviewed. The patient has been examined.  There have been no significant clinical changes since the completion of the originally dated History and Physical.    Signed By: Diana Macdonald MD     September 7, 2017 6:40 AM

## 2017-12-14 NOTE — PROGRESS NOTES
[facility] at the [address], [facility], [address] [facility]  Phone: [phone]      Fax:  [phone]    Discharge Summary    Patient name: Sharron Do Start of Care: 2017   Referral source: Mike Emerson MD : 1970                         Medical Diagnosis: Contusion of left hip [S70.02XA] Onset Date:17                         Treatment Diagnosis: Left hip pain and Lumbopelvic Dysfunction    Prior Hospitalization: see medical history Provider#: [id number]   Medications: Verified on Patient summary List    Comorbidities: BMI >30, Hx of back pain, HTN    Prior Level of Function: pain free and unrestricted with daily, recreational and work related activities    Visits from Start of Care: 4    Missed Visits: 0  Reporting Period : 17 to 17    Progress Toward Goals:  1.  Pt will be independent with HEP to encourage prophylaxis. Eval:dispensed  Current: pt reports compliance      Long Term Goals: LTG- To be accomplished in 4 week(s):  1.  Pt will demonstrate ability to sit >45 min without pain to allow pt to perform work related activities. Eval:pain immediately with sitting. Current:Progressing   20 minutes without pain. Pain increases after 20      2.  Pt will increase hip IR and ER AROM to [facility] to increase tolerance to ADLs. Eval:  AROM/PROM Right Left   Hip IR 25 28   Hip ER 20 24       Current: Progressing   Hip: IR: Right: 27, Left: 32 ER: Right: 22, Left: 25      3.  Pt will increase trunk rotation to 16 in or less bilaterally to indicate mobility needed for daily activities. Eval:Right 18 in Left 17 in   Current: Right: 15.5 in, Left: 16in - Partially Met      4.  Pt FOTO score will increase by >15 points to show improvement in function. Eval:66  Current: will address at visit 5    Assessment/ Summary of Care:   Status at last daily note:  Pt reports continued low level pain, increases with sitting.  Very TTP over posterior hip musculature and right greater trochanter. Discussed continuing PT x1-2 weeks. Made recommendations for sitting modifications. Treatment included therapeutic exercises and activities for ROM, mobility, strength and stabilization.     RECOMMENDATIONS:  [x]Discontinue therapy: [x]Patient has reached or is progressing toward set goals      [x]Patient is non-compliant or has abdicated      []Due to lack of appreciable progress towards set goals    Justin Ojeda, PT, DPT 12/14/2017 11:44 AM

## 2025-01-17 ENCOUNTER — HOSPITAL ENCOUNTER (OUTPATIENT)
Facility: HOSPITAL | Age: 55
Discharge: HOME | End: 2025-01-20
Payer: COMMERCIAL

## 2025-01-17 DIAGNOSIS — Z01.818 PRE-OP TESTING: Primary | ICD-10-CM

## 2025-01-17 LAB
ANION GAP SERPL CALC-SCNC: 3 MMOL/L (ref 3–18)
BUN SERPL-MCNC: 14 MG/DL (ref 7–18)
BUN/CREAT SERPL: 19 (ref 12–20)
CALCIUM SERPL-MCNC: 10.5 MG/DL (ref 8.5–10.1)
CHLORIDE SERPL-SCNC: 107 MMOL/L (ref 100–111)
CO2 SERPL-SCNC: 29 MMOL/L (ref 21–32)
CREAT SERPL-MCNC: 0.74 MG/DL (ref 0.6–1.3)
ERYTHROCYTE [DISTWIDTH] IN BLOOD BY AUTOMATED COUNT: 12.4 % (ref 11.6–14.5)
GLUCOSE SERPL-MCNC: 97 MG/DL (ref 74–99)
HCT VFR BLD AUTO: 39.1 % (ref 35–45)
HGB BLD-MCNC: 12.3 G/DL (ref 12–16)
MCH RBC QN AUTO: 28.8 PG (ref 24–34)
MCHC RBC AUTO-ENTMCNC: 31.5 G/DL (ref 31–37)
MCV RBC AUTO: 91.6 FL (ref 78–100)
NRBC # BLD: 0 K/UL (ref 0–0.01)
NRBC BLD-RTO: 0 PER 100 WBC
PLATELET # BLD AUTO: 270 K/UL (ref 135–420)
PMV BLD AUTO: 10.6 FL (ref 9.2–11.8)
POTASSIUM SERPL-SCNC: 4 MMOL/L (ref 3.5–5.5)
RBC # BLD AUTO: 4.27 M/UL (ref 4.2–5.3)
SODIUM SERPL-SCNC: 139 MMOL/L (ref 136–145)
WBC # BLD AUTO: 12.2 K/UL (ref 4.6–13.2)

## 2025-01-17 PROCEDURE — 80048 BASIC METABOLIC PNL TOTAL CA: CPT

## 2025-01-17 PROCEDURE — 85027 COMPLETE CBC AUTOMATED: CPT

## 2025-01-17 PROCEDURE — 36415 COLL VENOUS BLD VENIPUNCTURE: CPT

## 2025-01-29 NOTE — H&P
[facility] [place]  [address]  [facility] [address]  Tel:  [phone]  Fax: [phone]    Patient :  Geovanni Montemayor  YOB: 1970  Birth Sex:  Female  Date:   01/20/2025 11:20 AM   Visit Type:    Office Visit  Assessment/Plan  #  Detail Type  Description   1.  Assessment  Frequency of micturition (R35.0).    Patient Plan   She underwent an Interstim implant in Sept 2017.    On satisfied with urinary frequency     Requested SNM InterStim apparatus removed.  Surgery scheduled for 01/30/2025 with Dr. Davidson  We reviewed risk of infection, pain and bleeding.  Also aware that certain parts may not be able to be taken out to its entirety.  Since this has been in for 8 years now.    Patient will like to proceed  Tramadol 50 mg 1 tablet every 1-6 hours as needed for after surgery pain prescribed  Return as per Dr. Davidson recommends         2.  Assessment  Urgency of urination (R39.15).         3.  Assessment  Nocturia (R35.1).         4.  Assessment  Urge incontinence (N39.41).         5.  Other Orders  Orders not associated to today's assessments.    Plan Orders  UA In Office No Micro to be performed. Obtained on 01/20/2025.              Additional Visit Information    This 54 year old patient presents for Overactive Bladder and Urinary incontinence.    History of Present Illness  1.  Overactive Bladder   Onset was gradual. It occurs daily. The problem is improving. Associated symptoms include nocturia (2 times per night), urinary incontinence (mixed) and urinary frequency. Pertinent negatives include chills, constipation, fever, hematuria, pelvic pressure, pressure and urgency. Additional information: Pt with hx of urinary difficulties she is doing much better after InterStim implant 9/7/17.  Since last year she has had problems over the last year.  She is now on setting of 9. Her biggest problem is frequency, she has intermittent urge issues as well. I will have [employer] rep

## 2025-01-30 ENCOUNTER — HOSPITAL ENCOUNTER (OUTPATIENT)
Facility: HOSPITAL | Age: 55
Setting detail: OUTPATIENT SURGERY
Discharge: HOME | End: 2025-01-30
Attending: UROLOGY | Admitting: UROLOGY
Payer: COMMERCIAL

## 2025-01-30 ENCOUNTER — APPOINTMENT (OUTPATIENT)
Facility: HOSPITAL | Age: 55
End: 2025-01-30
Attending: UROLOGY
Payer: COMMERCIAL

## 2025-01-30 ENCOUNTER — ANESTHESIA (OUTPATIENT)
Facility: HOSPITAL | Age: 55
End: 2025-01-30
Payer: COMMERCIAL

## 2025-01-30 ENCOUNTER — ANESTHESIA EVENT (OUTPATIENT)
Facility: HOSPITAL | Age: 55
End: 2025-01-30
Payer: COMMERCIAL

## 2025-01-30 VITALS
HEIGHT: 66 IN | RESPIRATION RATE: 15 BRPM | DIASTOLIC BLOOD PRESSURE: 75 MMHG | OXYGEN SATURATION: 100 % | HEART RATE: 71 BPM | SYSTOLIC BLOOD PRESSURE: 131 MMHG | WEIGHT: 213.6 LBS | BODY MASS INDEX: 34.33 KG/M2 | TEMPERATURE: 97.2 F

## 2025-01-30 PROCEDURE — 3600000012 HC SURGERY LEVEL 2 ADDTL 15MIN: Performed by: UROLOGY

## 2025-01-30 PROCEDURE — 7100000000 HC PACU RECOVERY - FIRST 15 MIN: Performed by: UROLOGY

## 2025-01-30 PROCEDURE — 2500000003 HC RX 250 WO HCPCS: Performed by: UROLOGY

## 2025-01-30 PROCEDURE — 7100000011 HC PHASE II RECOVERY - ADDTL 15 MIN: Performed by: UROLOGY

## 2025-01-30 PROCEDURE — 3700000000 HC ANESTHESIA ATTENDED CARE: Performed by: UROLOGY

## 2025-01-30 PROCEDURE — 6360000002 HC RX W HCPCS: Performed by: UROLOGY

## 2025-01-30 PROCEDURE — 2500000003 HC RX 250 WO HCPCS

## 2025-01-30 PROCEDURE — 7100000010 HC PHASE II RECOVERY - FIRST 15 MIN: Performed by: UROLOGY

## 2025-01-30 PROCEDURE — 7100000001 HC PACU RECOVERY - ADDTL 15 MIN: Performed by: UROLOGY

## 2025-01-30 PROCEDURE — 77002 NEEDLE LOCALIZATION BY XRAY: CPT

## 2025-01-30 PROCEDURE — 2709999900 HC NON-CHARGEABLE SUPPLY: Performed by: UROLOGY

## 2025-01-30 PROCEDURE — 2580000003 HC RX 258: Performed by: UROLOGY

## 2025-01-30 PROCEDURE — 88300 SURGICAL PATH GROSS: CPT

## 2025-01-30 PROCEDURE — 3600000002 HC SURGERY LEVEL 2 BASE: Performed by: UROLOGY

## 2025-01-30 PROCEDURE — 6360000002 HC RX W HCPCS

## 2025-01-30 PROCEDURE — 3700000001 HC ADD 15 MINUTES (ANESTHESIA): Performed by: UROLOGY

## 2025-01-30 RX ORDER — ONDANSETRON 2 MG/ML
INJECTION INTRAMUSCULAR; INTRAVENOUS
Status: DISCONTINUED | OUTPATIENT
Start: 2025-01-30 | End: 2025-01-30 | Stop reason: SDUPTHER

## 2025-01-30 RX ORDER — FENTANYL CITRATE 50 UG/ML
25 INJECTION, SOLUTION INTRAMUSCULAR; INTRAVENOUS EVERY 5 MIN PRN
Status: DISCONTINUED | OUTPATIENT
Start: 2025-01-30 | End: 2025-01-30 | Stop reason: HOSPADM

## 2025-01-30 RX ORDER — SUCCINYLCHOLINE/SOD CL,ISO/PF 100 MG/5ML
SYRINGE (ML) INTRAVENOUS
Status: DISCONTINUED | OUTPATIENT
Start: 2025-01-30 | End: 2025-01-30 | Stop reason: SDUPTHER

## 2025-01-30 RX ORDER — DEXAMETHASONE SODIUM PHOSPHATE 4 MG/ML
INJECTION, SOLUTION INTRA-ARTICULAR; INTRALESIONAL; INTRAMUSCULAR; INTRAVENOUS; SOFT TISSUE
Status: DISCONTINUED | OUTPATIENT
Start: 2025-01-30 | End: 2025-01-30 | Stop reason: SDUPTHER

## 2025-01-30 RX ORDER — SODIUM CHLORIDE 0.9 % (FLUSH) 0.9 %
5-40 SYRINGE (ML) INJECTION PRN
Status: DISCONTINUED | OUTPATIENT
Start: 2025-01-30 | End: 2025-01-30 | Stop reason: HOSPADM

## 2025-01-30 RX ORDER — OXYCODONE HYDROCHLORIDE 5 MG/1
5 TABLET ORAL
Status: DISCONTINUED | OUTPATIENT
Start: 2025-01-30 | End: 2025-01-30 | Stop reason: HOSPADM

## 2025-01-30 RX ORDER — PROPOFOL 10 MG/ML
INJECTION, EMULSION INTRAVENOUS
Status: DISCONTINUED | OUTPATIENT
Start: 2025-01-30 | End: 2025-01-30 | Stop reason: SDUPTHER

## 2025-01-30 RX ORDER — FENTANYL CITRATE 50 UG/ML
INJECTION, SOLUTION INTRAMUSCULAR; INTRAVENOUS
Status: DISCONTINUED | OUTPATIENT
Start: 2025-01-30 | End: 2025-01-30 | Stop reason: SDUPTHER

## 2025-01-30 RX ORDER — MIDAZOLAM HYDROCHLORIDE 1 MG/ML
INJECTION, SOLUTION INTRAMUSCULAR; INTRAVENOUS
Status: DISCONTINUED | OUTPATIENT
Start: 2025-01-30 | End: 2025-01-30 | Stop reason: SDUPTHER

## 2025-01-30 RX ORDER — LABETALOL HYDROCHLORIDE 5 MG/ML
10 INJECTION, SOLUTION INTRAVENOUS
Status: DISCONTINUED | OUTPATIENT
Start: 2025-01-30 | End: 2025-01-30 | Stop reason: HOSPADM

## 2025-01-30 RX ORDER — SODIUM CHLORIDE 9 MG/ML
INJECTION, SOLUTION INTRAVENOUS CONTINUOUS
Status: DISCONTINUED | OUTPATIENT
Start: 2025-01-30 | End: 2025-01-30 | Stop reason: HOSPADM

## 2025-01-30 RX ORDER — SODIUM CHLORIDE 0.9 % (FLUSH) 0.9 %
5-40 SYRINGE (ML) INJECTION EVERY 12 HOURS SCHEDULED
Status: DISCONTINUED | OUTPATIENT
Start: 2025-01-30 | End: 2025-01-30 | Stop reason: HOSPADM

## 2025-01-30 RX ORDER — SODIUM CHLORIDE, SODIUM LACTATE, POTASSIUM CHLORIDE, CALCIUM CHLORIDE 600; 310; 30; 20 MG/100ML; MG/100ML; MG/100ML; MG/100ML
INJECTION, SOLUTION INTRAVENOUS CONTINUOUS
Status: DISCONTINUED | OUTPATIENT
Start: 2025-01-30 | End: 2025-01-30 | Stop reason: HOSPADM

## 2025-01-30 RX ORDER — SODIUM CHLORIDE 9 MG/ML
INJECTION, SOLUTION INTRAVENOUS PRN
Status: DISCONTINUED | OUTPATIENT
Start: 2025-01-30 | End: 2025-01-30 | Stop reason: HOSPADM

## 2025-01-30 RX ORDER — ROCURONIUM BROMIDE 10 MG/ML
INJECTION, SOLUTION INTRAVENOUS
Status: DISCONTINUED | OUTPATIENT
Start: 2025-01-30 | End: 2025-01-30 | Stop reason: SDUPTHER

## 2025-01-30 RX ORDER — DIPHENHYDRAMINE HYDROCHLORIDE 50 MG/ML
12.5 INJECTION INTRAMUSCULAR; INTRAVENOUS
Status: DISCONTINUED | OUTPATIENT
Start: 2025-01-30 | End: 2025-01-30 | Stop reason: HOSPADM

## 2025-01-30 RX ORDER — DROPERIDOL 2.5 MG/ML
0.62 INJECTION, SOLUTION INTRAMUSCULAR; INTRAVENOUS
Status: DISCONTINUED | OUTPATIENT
Start: 2025-01-30 | End: 2025-01-30 | Stop reason: HOSPADM

## 2025-01-30 RX ORDER — ONDANSETRON 2 MG/ML
4 INJECTION INTRAMUSCULAR; INTRAVENOUS
Status: DISCONTINUED | OUTPATIENT
Start: 2025-01-30 | End: 2025-01-30 | Stop reason: HOSPADM

## 2025-01-30 RX ORDER — HYDROMORPHONE HYDROCHLORIDE 1 MG/ML
0.25 INJECTION, SOLUTION INTRAMUSCULAR; INTRAVENOUS; SUBCUTANEOUS EVERY 5 MIN PRN
Status: DISCONTINUED | OUTPATIENT
Start: 2025-01-30 | End: 2025-01-30 | Stop reason: HOSPADM

## 2025-01-30 RX ORDER — LIDOCAINE HYDROCHLORIDE 20 MG/ML
INJECTION, SOLUTION INTRAVENOUS
Status: DISCONTINUED | OUTPATIENT
Start: 2025-01-30 | End: 2025-01-30 | Stop reason: SDUPTHER

## 2025-01-30 RX ORDER — NALOXONE HYDROCHLORIDE 0.4 MG/ML
INJECTION, SOLUTION INTRAMUSCULAR; INTRAVENOUS; SUBCUTANEOUS PRN
Status: DISCONTINUED | OUTPATIENT
Start: 2025-01-30 | End: 2025-01-30 | Stop reason: HOSPADM

## 2025-01-30 RX ADMIN — Medication 100 MG: at 09:22

## 2025-01-30 RX ADMIN — FENTANYL CITRATE 25 MCG: 50 INJECTION, SOLUTION INTRAMUSCULAR; INTRAVENOUS at 09:21

## 2025-01-30 RX ADMIN — DEXAMETHASONE SODIUM PHOSPHATE 4 MG: 4 INJECTION INTRA-ARTICULAR; INTRALESIONAL; INTRAMUSCULAR; INTRAVENOUS; SOFT TISSUE at 09:34

## 2025-01-30 RX ADMIN — FENTANYL CITRATE 50 MCG: 50 INJECTION, SOLUTION INTRAMUSCULAR; INTRAVENOUS at 09:17

## 2025-01-30 RX ADMIN — PROPOFOL 20 MG: 10 INJECTION, EMULSION INTRAVENOUS at 09:23

## 2025-01-30 RX ADMIN — MIDAZOLAM 2 MG: 1 INJECTION INTRAMUSCULAR; INTRAVENOUS at 09:17

## 2025-01-30 RX ADMIN — WATER 2000 MG: 1 INJECTION INTRAMUSCULAR; INTRAVENOUS; SUBCUTANEOUS at 09:34

## 2025-01-30 RX ADMIN — PROPOFOL 80 MG: 10 INJECTION, EMULSION INTRAVENOUS at 09:22

## 2025-01-30 RX ADMIN — FENTANYL CITRATE 25 MCG: 50 INJECTION, SOLUTION INTRAMUSCULAR; INTRAVENOUS at 09:39

## 2025-01-30 RX ADMIN — LIDOCAINE HYDROCHLORIDE 100 MG: 20 INJECTION, SOLUTION INTRAVENOUS at 09:22

## 2025-01-30 RX ADMIN — ONDANSETRON 4 MG: 2 INJECTION INTRAMUSCULAR; INTRAVENOUS at 09:34

## 2025-01-30 RX ADMIN — ROCURONIUM BROMIDE 10 MG: 10 INJECTION INTRAVENOUS at 09:22

## 2025-01-30 RX ADMIN — SODIUM CHLORIDE: 9 INJECTION, SOLUTION INTRAVENOUS at 07:27

## 2025-01-30 ASSESSMENT — PAIN DESCRIPTION - PAIN TYPE
TYPE: SURGICAL PAIN

## 2025-01-30 ASSESSMENT — PAIN DESCRIPTION - ONSET
ONSET: GRADUAL
ONSET: GRADUAL
ONSET: ON-GOING
ONSET: ON-GOING
ONSET: GRADUAL
ONSET: ON-GOING
ONSET: ON-GOING

## 2025-01-30 ASSESSMENT — PAIN DESCRIPTION - LOCATION
LOCATION: BACK

## 2025-01-30 ASSESSMENT — PAIN SCALES - GENERAL
PAINLEVEL_OUTOF10: 3
PAINLEVEL_OUTOF10: 2
PAINLEVEL_OUTOF10: 3
PAINLEVEL_OUTOF10: 3
PAINLEVEL_OUTOF10: 2
PAINLEVEL_OUTOF10: 2
PAINLEVEL_OUTOF10: 3
PAINLEVEL_OUTOF10: 0

## 2025-01-30 ASSESSMENT — PAIN DESCRIPTION - FREQUENCY
FREQUENCY: CONTINUOUS

## 2025-01-30 ASSESSMENT — PAIN - FUNCTIONAL ASSESSMENT
PAIN_FUNCTIONAL_ASSESSMENT: ACTIVITIES ARE NOT PREVENTED

## 2025-01-30 ASSESSMENT — PAIN DESCRIPTION - ORIENTATION
ORIENTATION: LEFT;LOWER
ORIENTATION: LEFT
ORIENTATION: LEFT;LOWER
ORIENTATION: LEFT;LOWER

## 2025-01-30 ASSESSMENT — PAIN DESCRIPTION - DESCRIPTORS
DESCRIPTORS: SHARP
DESCRIPTORS: ACHING
DESCRIPTORS: SHARP
DESCRIPTORS: ACHING
DESCRIPTORS: SHARP
DESCRIPTORS: ACHING;SHARP
DESCRIPTORS: ACHING

## 2025-01-30 NOTE — PERIOP NOTE
TRANSFER - IN REPORT:    Verbal report received from Connie FIGUEROA on Geovanni Montemayor  being received from PACU for routine progression of patient care      Report consisted of patient's Situation, Background, Assessment and   Recommendations(SBAR).     Information from the following report(s) Nurse Handoff Report was reviewed with the receiving nurse.    Opportunity for questions and clarification was provided.      Assessment completed upon patient's arrival to unit and care assumed.

## 2025-01-30 NOTE — DISCHARGE INSTRUCTIONS
Duane Davidson M.D.  [facility]  [address], [place] [address]  Office: [phone]  Fax:    [phone]    PROCEDURE: Procedure(s):  EXPLANT OF INTERSTIM WITH C-ARM    Notify [facility] IMMEDIATELY if any of the following occur:    You are unable to urinate.  Urgency to urinate is not uncommon.  You find yourself urinating small frequent amounts associated with severe lower abdominal discomfort.  Bright red blood with clots in the urine. Some reddish urine is not uncommon and should be treated with increasing the amount of fluids you drink.  Temperature above 101.5° and / or chills.  You are nauseous and / or vomiting and you cannot hold down any fluids.  Your pain is not controlled with the pain medication prescribed.    Special Considerations:     Do not drive for at least 24 hours after the procedure and until you are no longer taking narcotic pain medication and you are able to move and react without hesitation.      MEDICATIONS:  Pain   []  Norco®   []  Percocet®  [] Dilaudid®    []  Tramadol  [] Ketorolac   Antibiotics   []  Cipro   []  Keflex    [] Levaquin   []  Bactrim DS®      [] Cefuroxime   Urination   []  Vesicare®   []  Flomax      Burning   []  Pyridium®   []  UribelTM      Nausea   []  Zofran®   []  Phenergan®      Miscellaneous   []            [] Prescriptions Written on Chart    [x] Prescriptions sent Electronically prior to surgery           Our office will call you tomorrow to schedule your first follow-up appointment.    Please contact [facility] at [phone] or go to the nearest Emergency Department / Urgent Care facility for any other medical questions or concerns.

## 2025-01-30 NOTE — OP NOTE
Operative Note      Patient: Geovanni Montemayor  YOB: 1970  MRN: [id number]    Date of Procedure: 1/30/2025    Pre-Op Diagnosis Codes:      * Mechanical breakdown of urinary electronic stimulator device, initial encounter ([facility]) [T83.110A]    Post-Op Diagnosis: Same       Procedure(s):  EXPLANT OF INTERSTIM WITH C-ARM    Surgeon(s):  Duane Davidson MD    Assistant:   Surgical Assistant: Sherly Reynoso    Anesthesia: Other    Estimated Blood Loss (mL): Minimal    Complications: None    Specimens:   ID Type Source Tests Collected by Time Destination   A : FOR GROSS EXAMINATION Hardware Back SURGICAL PATHOLOGY Duane aDvidson MD 1/30/2025 0950        Implants:  * No implants in log *      Drains: * No LDAs found *    Findings:  Infection Present At Time Of Surgery (PATOS) (choose all levels that have infection present):  No infection present      Detailed Description of Procedure:   Informed consent was obtained, the patient taken to the operating room and after administration of general anesthesia on OR stretcher, she was flipped in the prone position. All pressure pints were secured.  The patient was prepped and draped in usual surgical fashion.  At this point the skin over the old device on the left side was anesthetized with 0.25% Marcaine: Using a 15 blade I made an incision over the old device then using bovie cautery I dissected the device free. I used the supplied screw  and removed the old device from the permanent lead.  I then dissected the lead medially to the insertion site near the midline. I then used a 15 blade to make a small incision over the insertion site and with the aid of fluoroscopy, I grasped the lead with a right angle clamp and jarek it out of the incision.   I was able to remove the lead completely by using gentle traction in a cephalad manner. The wound was irrigated and a saline soaked gauze was placed in the wound.   The wound was closed in 2 layers; a running 3-0 Vicryl

## 2025-01-30 NOTE — INTERVAL H&P NOTE
Update History & Physical    The patient's History and Physical of January 20, 2025 was reviewed with the patient and I examined the patient. There was no change. The surgical site was confirmed by the patient and me.     Plan: The risks, benefits, expected outcome, and alternative to the recommended procedure have been discussed with the patient. Patient understands and wants to proceed with the procedure.     Electronically signed by Duane Davidson MD on 1/30/2025 at 6:41 AM

## 2025-01-30 NOTE — ANESTHESIA PRE PROCEDURE
Height: 1.676 m (5' 6\")                                              BP Readings from Last 3 Encounters:   01/30/25 123/78       NPO Status: Time of last liquid consumption: 2000                        Time of last solid consumption: 2000                        Date of last liquid consumption: 01/29/25                        Date of last solid food consumption: 01/29/25    BMI:   Wt Readings from Last 3 Encounters:   01/30/25 96.9 kg (213 lb 9.6 oz)   01/21/25 95.3 kg (210 lb)     Body mass index is 34.48 kg/m².    CBC:   Lab Results   Component Value Date/Time    WBC 12.2 01/17/2025 01:09 PM    RBC 4.27 01/17/2025 01:09 PM    HGB 12.3 01/17/2025 01:09 PM    HCT 39.1 01/17/2025 01:09 PM    MCV 91.6 01/17/2025 01:09 PM    RDW 12.4 01/17/2025 01:09 PM     01/17/2025 01:09 PM       CMP:   Lab Results   Component Value Date/Time     01/17/2025 01:09 PM    K 4.0 01/17/2025 01:09 PM     01/17/2025 01:09 PM    CO2 29 01/17/2025 01:09 PM    BUN 14 01/17/2025 01:09 PM    CREATININE 0.74 01/17/2025 01:09 PM    LABGLOM >90 01/17/2025 01:09 PM    GLUCOSE 97 01/17/2025 01:09 PM    CALCIUM 10.5 01/17/2025 01:09 PM       POC Tests: No results for input(s): \"POCGLU\", \"POCNA\", \"POCK\", \"POCCL\", \"POCBUN\", \"POCHEMO\", \"POCHCT\" in the last 72 hours.    Coags: No results found for: \"PROTIME\", \"INR\", \"APTT\"    HCG (If Applicable): No results found for: \"PREGTESTUR\", \"PREGSERUM\", \"HCG\", \"HCGQUANT\"     ABGs: No results found for: \"PHART\", \"PO2ART\", \"IBQ1GWF\", \"WTO1CZM\", \"BEART\", \"G4BAIWJQ\"     Type & Screen (If Applicable):  No results found for: \"ABORH\", \"LABANTI\"    Drug/Infectious Status (If Applicable):  No results found for: \"HIV\", \"HEPCAB\"    COVID-19 Screening (If Applicable): No results found for: \"COVID19\"        Anesthesia Evaluation  Patient summary reviewed and Nursing notes reviewed   no history of anesthetic complications:   Airway: Mallampati: II  TM distance: >3 FB   Neck ROM: full  Mouth opening: > = 3

## 2025-01-30 NOTE — INTERVAL H&P NOTE
Update History & Physical    The patient's History and Physical of January 20, 2025 was reviewed with the patient and I examined the patient. There was no change. The surgical site was confirmed by the patient and me.     Plan: The risks, benefits, expected outcome, and alternative to the recommended procedure have been discussed with the patient. Patient understands and wants to proceed with the procedure.     Electronically signed by Duane Davidson MD on 1/30/2025 at 6:37 AM

## 2025-01-30 NOTE — PERIOP NOTE
TRANSFER - OUT REPORT:    Verbal report given to PATI Smith RN on Geovanni Montemayor  being transferred to Phase 2 for routine progression of patient care       Report consisted of patient's Situation, Background, Assessment and   Recommendations(SBAR).     Information from the following report(s) Nurse Handoff Report, Adult Overview, Surgery Report, Intake/Output, and MAR was reviewed with the receiving nurse.           Lines:   Peripheral IV 01/30/25 Left;Posterior Hand (Active)   Site Assessment Clean, dry & intact 01/30/25 1108   Line Status Infusing 01/30/25 1108   Line Care Connections checked and tightened 01/30/25 1108   Phlebitis Assessment No symptoms 01/30/25 1108   Infiltration Assessment 0 01/30/25 1108   Alcohol Cap Used No 01/30/25 1108   Dressing Status Clean, dry & intact 01/30/25 1108   Dressing Type Transparent 01/30/25 1108   Dressing Intervention New 01/30/25 0732        Opportunity for questions and clarification was provided.      Patient transported with:  Tech

## 2025-01-30 NOTE — PERIOP NOTE
TRANSFER - IN REPORT:    Verbal report received from LIBAN Cook RN & EZRA Gonzalez CRNA on Geovanni Montemayor  being received from OR for routine progression of patient care      Report consisted of patient's Situation, Background, Assessment and   Recommendations(SBAR).     Information from the following report(s) Nurse Handoff Report, Adult Overview, Surgery Report, Intake/Output, and MAR was reviewed with the receiving nurse.    Opportunity for questions and clarification was provided.      Assessment completed upon patient's arrival to unit and care assumed.

## 2025-01-30 NOTE — PERIOP NOTE
Reviewed PTA medication list with patient/caregiver and patient/caregiver denies any additional medications.     Patient admits to having a responsible adult care for them at home for at least 24 hours after surgery.    Patient encouraged to use gown warming system and informed that using said warming gown to regulate body temperature prior to a procedure has been shown to help reduce the risks of blood clots and infection.    Patient's pharmacy of choice verified and documented in PTA medication section.    Dual skin assessment & fall risk band verification completed with Silvia GROSS RN.

## 2025-01-30 NOTE — ANESTHESIA POSTPROCEDURE EVALUATION
Post-Anesthesia Evaluation and Assessment    Cardiovascular Function/Vital Signs/Pain Score:  Vitals  BP: 126/72  Temp: 97.8 °F (36.6 °C)  Temp Source: Temporal  Pulse: 69  Respirations: 16  SpO2: 95 %  Height: 167.6 cm (5' 6\")  Weight - Scale: 96.9 kg (213 lb 9.6 oz)  Pain Level: 3    Patient is status post Procedure(s):  EXPLANT OF INTERSTIM WITH C-ARM.    Nausea/Vomiting: Controlled.    Postoperative hydration reviewed and adequate.    Pain:  Managed    Mental Status and Level of Consciousness: Baseline and appropriate for discharge.    Adequate oxygenation and airway patent.    Complications related to anesthesia: None    Post-anesthesia assessment completed. No concerns.    Patient has met all discharge requirements.    Signed By: Jesus Cazares MD    January 30, 2025

## (undated) DEVICE — SYRINGE MED 10ML TRNSLUC BRL PLUNG BLK MRK POLYPR CTRL

## (undated) DEVICE — ELECTRODE PT RET AD L9FT HI MOIST COND ADH HYDRGEL CORDED

## (undated) DEVICE — 3M™ TEGADERM™ TRANSPARENT FILM DRESSING FRAME STYLE, 1626W, 4 IN X 4-3/4 IN (10 CM X 12 CM), 50/CT 4CT/CASE: Brand: 3M™ TEGADERM™

## (undated) DEVICE — ANTENNA PT PRGMR EXT DETACH RECHRG DBS LEGEND

## (undated) DEVICE — SYR 10ML CTRL LR LCK NSAF LF --

## (undated) DEVICE — SPONGE GZ W4XL4IN COT 12 PLY TYP VII WVN C FLD DSGN STERILE

## (undated) DEVICE — LIQUIBAND RAPID ADHESIVE 36/CS 0.8ML: Brand: MEDLINE

## (undated) DEVICE — MASTISOL ADHESIVE LIQ 2/3ML

## (undated) DEVICE — SUTURE VCRL SZ 3-0 L27IN ABSRB UD L26MM SH 1/2 CIR J416H

## (undated) DEVICE — SUTURE VICRYL + SZ 4-0 L27IN ABSRB UD PS-2 3/8 CIR REV CUT VCP426H

## (undated) DEVICE — SHEET,DRAPE,40X58,STERILE: Brand: MEDLINE

## (undated) DEVICE — DBD-PACK,LAPAROTOMY,2 REINFORCED GOWNS: Brand: MEDLINE

## (undated) DEVICE — GOWN,AURORA,NONRNF,XL,30/CS: Brand: MEDLINE

## (undated) DEVICE — BSHR MAJOR BASIN PACK-LF: Brand: MEDLINE INDUSTRIES, INC.

## (undated) DEVICE — DRAPE C ARM UNIV W41XL74IN CLR PLAS XR VELC CLSR POLY STRP

## (undated) DEVICE — (D)STRIP SKN CLSR 0.5X4IN WHT --

## (undated) DEVICE — SOLUTION IRRIG 1500ML 0.9% SOD CHL USP POUR PLAS BTL

## (undated) DEVICE — PENCIL ES L3M ROCK SWCH S STL HEX LOK BLDE ELECTRD HOLSTER

## (undated) DEVICE — APPLICATOR MEDICATED 26 CC SOLUTION HI LT ORNG CHLORAPREP

## (undated) DEVICE — BLADE,CARBON-STEEL,15,STRL,DISPOSABLE,TB: Brand: MEDLINE

## (undated) DEVICE — LEAD KT INTRO INTERSTIM --

## (undated) DEVICE — 3M™ STERI-DRAPE™ INCISE DRAPE 1050 (60CM X 45CM): Brand: STERI-DRAPE™

## (undated) DEVICE — GARMENT,MEDLINE,DVT,INT,CALF,MED, GEN2: Brand: MEDLINE

## (undated) DEVICE — DEVON™ KNEE AND BODY STRAP 60" X 3" (1.5 M X 7.6 CM): Brand: DEVON

## (undated) DEVICE — SPONGE GZ W4XL4IN COT 12 PLY TYP VII WVN C FLD DSGN

## (undated) DEVICE — MINOR: Brand: MEDLINE INDUSTRIES, INC.

## (undated) DEVICE — DERMABOND SKIN ADH 0.7ML -- DERMABOND ADVANCED 12/BX

## (undated) DEVICE — STRAP,POSITIONING,KNEE/BODY,FOAM,4X60": Brand: MEDLINE

## (undated) DEVICE — SUTURE VCRL SZ 4-0 L27IN ABSRB UD L19MM PS-2 3/8 CIR PRIM J426H

## (undated) DEVICE — SKIN MARKER,REGULAR TIP WITH RULER AND LABELS: Brand: DEVON

## (undated) DEVICE — (D)PREP SKN CHLRAPRP APPL 26ML -- CONVERT TO ITEM 371833

## (undated) DEVICE — STRIP,CLOSURE,WOUND,MEDI-STRIP,1/2X4: Brand: MEDLINE

## (undated) DEVICE — REM POLYHESIVE ADULT PATIENT RETURN ELECTRODE: Brand: VALLEYLAB

## (undated) DEVICE — STERILE POLYISOPRENE POWDER-FREE SURGICAL GLOVES: Brand: PROTEXIS

## (undated) DEVICE — PROGRAMMER NEUROSTIMULATOR PT FOR URIN CTRL INTERSTIM ICON

## (undated) DEVICE — SOLUTION IRRIG 1500ML STRL H2O AQUALITE PLAS POUR BTL

## (undated) DEVICE — 1010 S-DRAPE TOWEL DRAPE 10/BX: Brand: STERI-DRAPE™

## (undated) DEVICE — SUTURE VICRYL + SZ 3-0 L27IN ABSRB UD L26MM SH 1/2 CIR VCP416H

## (undated) DEVICE — INTENDED FOR TISSUE SEPARATION, AND OTHER PROCEDURES THAT REQUIRE A SHARP SURGICAL BLADE TO PUNCTURE OR CUT.: Brand: BARD-PARKER ® CARBON RIB-BACK BLADES